# Patient Record
Sex: MALE | Race: WHITE | Employment: FULL TIME | URBAN - METROPOLITAN AREA
[De-identification: names, ages, dates, MRNs, and addresses within clinical notes are randomized per-mention and may not be internally consistent; named-entity substitution may affect disease eponyms.]

---

## 2017-03-23 ENCOUNTER — HOSPITAL ENCOUNTER (EMERGENCY)
Age: 37
Discharge: HOME | End: 2017-03-23
Attending: EMERGENCY MEDICINE
Payer: SELF-PAY

## 2017-03-23 ENCOUNTER — APPOINTMENT (OUTPATIENT)
Dept: GENERAL RADIOLOGY | Age: 37
End: 2017-03-23
Attending: EMERGENCY MEDICINE
Payer: SELF-PAY

## 2017-03-23 VITALS
RESPIRATION RATE: 13 BRPM | DIASTOLIC BLOOD PRESSURE: 67 MMHG | SYSTOLIC BLOOD PRESSURE: 105 MMHG | WEIGHT: 180 LBS | OXYGEN SATURATION: 99 % | TEMPERATURE: 98.2 F | HEIGHT: 77 IN | HEART RATE: 74 BPM | BODY MASS INDEX: 21.25 KG/M2

## 2017-03-23 DIAGNOSIS — F17.210 CIGARETTE SMOKER: ICD-10-CM

## 2017-03-23 DIAGNOSIS — R06.02 SOB (SHORTNESS OF BREATH): Primary | ICD-10-CM

## 2017-03-23 LAB
ANION GAP BLD CALC-SCNC: 8 MMOL/L (ref 5–15)
BASOPHILS # BLD AUTO: 0 K/UL (ref 0–0.1)
BASOPHILS # BLD: 0 % (ref 0–1)
BUN SERPL-MCNC: 15 MG/DL (ref 6–20)
BUN/CREAT SERPL: 13 (ref 12–20)
CALCIUM SERPL-MCNC: 8.9 MG/DL (ref 8.5–10.1)
CHLORIDE SERPL-SCNC: 103 MMOL/L (ref 97–108)
CO2 SERPL-SCNC: 30 MMOL/L (ref 21–32)
CREAT SERPL-MCNC: 1.18 MG/DL (ref 0.7–1.3)
DIFFERENTIAL METHOD BLD: ABNORMAL
EOSINOPHIL # BLD: 0.1 K/UL (ref 0–0.4)
EOSINOPHIL NFR BLD: 2 % (ref 0–7)
ERYTHROCYTE [DISTWIDTH] IN BLOOD BY AUTOMATED COUNT: 12.6 % (ref 11.5–14.5)
GLUCOSE SERPL-MCNC: 90 MG/DL (ref 65–100)
HCT VFR BLD AUTO: 47.6 % (ref 36.6–50.3)
HGB BLD-MCNC: 15.8 G/DL (ref 12.1–17)
LYMPHOCYTES # BLD AUTO: 24 % (ref 12–49)
LYMPHOCYTES # BLD: 1.6 K/UL
MCH RBC QN AUTO: 31.3 PG (ref 26–34)
MCHC RBC AUTO-ENTMCNC: 33.2 G/DL (ref 30–36.5)
MCV RBC AUTO: 94.3 FL (ref 80–99)
MONOCYTES # BLD: 1 K/UL (ref 0–1)
MONOCYTES NFR BLD AUTO: 14 % (ref 5–13)
NEUTS SEG # BLD: 4 K/UL (ref 1.8–8)
NEUTS SEG NFR BLD AUTO: 60 % (ref 32–75)
PLATELET # BLD AUTO: 137 K/UL (ref 150–400)
POTASSIUM SERPL-SCNC: 3.9 MMOL/L (ref 3.5–5.1)
RBC # BLD AUTO: 5.05 M/UL (ref 4.1–5.7)
SODIUM SERPL-SCNC: 141 MMOL/L (ref 136–145)
TROPONIN I SERPL-MCNC: <0.04 NG/ML
WBC # BLD AUTO: 6.7 K/UL (ref 4.1–11.1)

## 2017-03-23 PROCEDURE — 74011250637 HC RX REV CODE- 250/637: Performed by: EMERGENCY MEDICINE

## 2017-03-23 PROCEDURE — 99284 EMERGENCY DEPT VISIT MOD MDM: CPT

## 2017-03-23 PROCEDURE — 94640 AIRWAY INHALATION TREATMENT: CPT

## 2017-03-23 PROCEDURE — 36415 COLL VENOUS BLD VENIPUNCTURE: CPT | Performed by: EMERGENCY MEDICINE

## 2017-03-23 PROCEDURE — 85025 COMPLETE CBC W/AUTO DIFF WBC: CPT | Performed by: EMERGENCY MEDICINE

## 2017-03-23 PROCEDURE — 84484 ASSAY OF TROPONIN QUANT: CPT | Performed by: EMERGENCY MEDICINE

## 2017-03-23 PROCEDURE — 71020 XR CHEST PA LAT: CPT

## 2017-03-23 PROCEDURE — 93005 ELECTROCARDIOGRAM TRACING: CPT

## 2017-03-23 PROCEDURE — 80048 BASIC METABOLIC PNL TOTAL CA: CPT | Performed by: EMERGENCY MEDICINE

## 2017-03-23 PROCEDURE — 77030012341 HC CHMB SPCR OPTC MDI VYRM -A

## 2017-03-23 RX ORDER — ALBUTEROL SULFATE 90 UG/1
2 AEROSOL, METERED RESPIRATORY (INHALATION)
Status: DISCONTINUED | OUTPATIENT
Start: 2017-03-23 | End: 2017-03-23 | Stop reason: HOSPADM

## 2017-03-23 RX ADMIN — ALBUTEROL SULFATE 2 PUFF: 90 AEROSOL, METERED RESPIRATORY (INHALATION) at 17:01

## 2017-03-23 NOTE — DISCHARGE INSTRUCTIONS
Stopping Smoking: Care Instructions  Your Care Instructions  Cigarette smokers crave the nicotine in cigarettes. Giving it up is much harder than simply changing a habit. Your body has to stop craving the nicotine. It is hard to quit, but you can do it. There are many tools that people use to quit smoking. You may find that combining tools works best for you. There are several steps to quitting. First you get ready to quit. Then you get support to help you. After that, you learn new skills and behaviors to become a nonsmoker. For many people, a necessary step is getting and using medicine. Your doctor will help you set up the plan that best meets your needs. You may want to attend a smoking cessation program to help you quit smoking. When you choose a program, look for one that has proven success. Ask your doctor for ideas. You will greatly increase your chances of success if you take medicine as well as get counseling or join a cessation program.  Some of the changes you feel when you first quit tobacco are uncomfortable. Your body will miss the nicotine at first, and you may feel short-tempered and grumpy. You may have trouble sleeping or concentrating. Medicine can help you deal with these symptoms. You may struggle with changing your smoking habits and rituals. The last step is the tricky one: Be prepared for the smoking urge to continue for a time. This is a lot to deal with, but keep at it. You will feel better. Follow-up care is a key part of your treatment and safety. Be sure to make and go to all appointments, and call your doctor if you are having problems. Its also a good idea to know your test results and keep a list of the medicines you take. How can you care for yourself at home? · Ask your family, friends, and coworkers for support. You have a better chance of quitting if you have help and support.   · Join a support group, such as Nicotine Anonymous, for people who are trying to quit smoking. · Consider signing up for a smoking cessation program, such as the American [employer] Association's Freedom from Smoking program.  · Set a quit date. Pick your date carefully so that it is not right in the middle of a big deadline or stressful time. Once you quit, do not even take a puff. Get rid of all ashtrays and lighters after your last cigarette. Clean your house and your clothes so that they do not smell of smoke. · Learn how to be a nonsmoker. Think about ways you can avoid those things that make you reach for a cigarette. ¨ Avoid situations that put you at greatest risk for smoking. For some people, it is hard to have a drink with friends without smoking. For others, they might skip a coffee break with coworkers who smoke. ¨ Change your daily routine. Take a different route to work or eat a meal in a different place. · Cut down on stress. Calm yourself or release tension by doing an activity you enjoy, such as reading a book, taking a hot bath, or gardening. · Talk to your doctor or pharmacist about nicotine replacement therapy, which replaces the nicotine in your body. You still get nicotine but you do not use tobacco. Nicotine replacement products help you slowly reduce the amount of nicotine you need. These products come in several forms, many of them available over-the-counter:  ¨ Nicotine patches  ¨ Nicotine gum and lozenges  ¨ Nicotine inhaler  · Ask your doctor about bupropion (Wellbutrin) or varenicline (Chantix), which are prescription medicines. They do not contain nicotine. They help you by reducing withdrawal symptoms, such as stress and anxiety. · Some people find hypnosis, acupuncture, and massage helpful for ending the smoking habit. · Eat a healthy diet and get regular exercise. Having healthy habits will help your body move past its craving for nicotine. · Be prepared to keep trying. Most people are not successful the first few times they try to quit.  Do not get mad at yourself if you smoke again. Make a list of things you learned and think about when you want to try again, such as next week, next month, or next year. Where can you learn more? Go to http://jonathan-porsha.info/. Enter R969 in the search box to learn more about \"Stopping Smoking: Care Instructions. \"  Current as of: May 26, 2016  Content Version: 11.1  © 9957-9851 [email or web address]. Care instructions adapted under license by [employer] (which disclaims liability or warranty for this information). If you have questions about a medical condition or this instruction, always ask your healthcare professional. [address] any warranty or liability for your use of this information. Shortness of Breath: Care Instructions  Your Care Instructions  Shortness of breath has many causes. Sometimes conditions such as anxiety can lead to shortness of breath. Some people get mild shortness of breath when they exercise. Trouble breathing also can be a symptom of a serious problem, such as asthma, lung disease, emphysema, heart problems, and pneumonia. If your shortness of breath continues, you may need tests and treatment. Watch for any changes in your breathing and other symptoms. Follow-up care is a key part of your treatment and safety. Be sure to make and go to all appointments, and call your doctor if you are having problems. Its also a good idea to know your test results and keep a list of the medicines you take. How can you care for yourself at home? · Do not smoke or allow others to smoke around you. If you need help quitting, talk to your doctor about stop-smoking programs and medicines. These can increase your chances of quitting for good. · Get plenty of rest and sleep. · Take your medicines exactly as prescribed. Call your doctor if you think you are having a problem with your medicine. · Find healthy ways to deal with stress. ¨ Exercise daily.   ¨ Get plenty of sleep. ¨ Eat regularly and well. When should you call for help? Call 911 anytime you think you may need emergency care. For example, call if:  · You have severe shortness of breath. · You have symptoms of a heart attack. These may include:  ¨ Chest pain or pressure, or a strange feeling in the chest.  ¨ Sweating. ¨ Shortness of breath. ¨ Nausea or vomiting. ¨ Pain, pressure, or a strange feeling in the back, neck, jaw, or upper belly or in one or both shoulders or arms. ¨ Lightheadedness or sudden weakness. ¨ A fast or irregular heartbeat. After you call 911, the  may tell you to chew 1 adult-strength or 2 to 4 low-dose aspirin. Wait for an ambulance. Do not try to drive yourself. Call your doctor now or seek immediate medical care if:  · Your shortness of breath gets worse or you start to wheeze. Wheezing is a high-pitched sound when you breathe. · You wake up at night out of breath or have to prop your head up on several pillows to breathe. · You are short of breath after only light activity or while at rest.  Watch closely for changes in your health, and be sure to contact your doctor if:  · You do not get better over the next 1 to 2 days. Where can you learn more? Go to http://jonathan-porsha.info/. Enter S780 in the search box to learn more about \"Shortness of Breath: Care Instructions. \"  Current as of: May 23, 2016  Content Version: 11.1  © 8926-2565 [email or web address]. Care instructions adapted under license by [employer] (which disclaims liability or warranty for this information). If you have questions about a medical condition or this instruction, always ask your healthcare professional. [address] any warranty or liability for your use of this information. We hope that we have addressed all of your medical concerns.  The examination and treatment you received in the Emergency Department were for an emergent problem and were not intended as complete care. It is important that you follow up with your healthcare provider(s) for ongoing care. If your symptoms worsen or do not improve as expected, and you are unable to reach your usual health care provider(s), you should return to the Emergency Department. Today's healthcare is undergoing tremendous change, and patient satisfaction surveys are one of the many tools to assess the quality of medical care. You may receive a survey from the [employer] regarding your experience in the Emergency Department. I hope that your experience has been completely positive, particularly the medical care that I provided. As such, please participate in the survey; anything less than excellent does not meet my expectations or intentions. [facility] and [address] [place] [address] participate in nationally recognized quality of care measures. If your blood pressure is greater than 120/80, as reported below, we urge that you seek medical care to address the potential of high blood pressure, commonly known as hypertension. Hypertension can be hereditary or can be caused by certain medical conditions, pain, stress, or \"white coat syndrome. \"       Please make an appointment with your health care provider(s) for follow up of your Emergency Department visit. VITALS:   Patient Vitals for the past 8 hrs:   Temp Pulse Resp BP SpO2   03/23/17 1612 98.2 °F (36.8 °C) 71 12 123/75 97 %          Thank you for allowing us to provide you with medical care today. We realize that you have many choices for your emergency care needs. Please choose us in the future for any continued health care needs.       Aneesh Wells MD    [address] [facility].   Office: [phone]            Recent Results (from the past 24 hour(s))   EKG, 12 LEAD, INITIAL    Collection Time: 03/23/17  4:08 PM   Result Value Ref Range Ventricular Rate 76 BPM    Atrial Rate 76 BPM    P-R Interval 158 ms    QRS Duration 98 ms    Q-T Interval 378 ms    QTC Calculation (Bezet) 425 ms    Calculated P Axis 73 degrees    Calculated R Axis 92 degrees    Calculated T Axis 60 degrees    Diagnosis       Normal sinus rhythm with sinus arrhythmia  Rightward axis  Incomplete right bundle branch block  Nonspecific ST abnormality  Abnormal ECG  When compared with ECG of 03-JUL-2015 19:15,  Vent. rate has increased BY  30 BPM  QT has lengthened     CBC WITH AUTOMATED DIFF    Collection Time: 03/23/17  4:18 PM   Result Value Ref Range    WBC 6.7 4.1 - 11.1 K/uL    RBC 5.05 4. 10 - 5.70 M/uL    HGB 15.8 12.1 - 17.0 g/dL    HCT 47.6 36.6 - 50.3 %    MCV 94.3 80.0 - 99.0 FL    MCH 31.3 26.0 - 34.0 PG    MCHC 33.2 30.0 - 36.5 g/dL    RDW 12.6 11.5 - 14.5 %    PLATELET 650 (L) 072 - 400 K/uL    NEUTROPHILS 60 32 - 75 %    LYMPHOCYTES 24 12 - 49 %    MONOCYTES 14 (H) 5 - 13 %    EOSINOPHILS 2 0 - 7 %    BASOPHILS 0 0 - 1 %    ABS. NEUTROPHILS 4.0 1.8 - 8.0 K/UL    ABS. LYMPHOCYTES 1.6 K/UL    ABS. MONOCYTES 1.0 0.0 - 1.0 K/UL    ABS. EOSINOPHILS 0.1 0.0 - 0.4 K/UL    ABS. BASOPHILS 0.0 0.0 - 0.1 K/UL    DF AUTOMATED     METABOLIC PANEL, BASIC    Collection Time: 03/23/17  4:18 PM   Result Value Ref Range    Sodium 141 136 - 145 mmol/L    Potassium 3.9 3.5 - 5.1 mmol/L    Chloride 103 97 - 108 mmol/L    CO2 30 21 - 32 mmol/L    Anion gap 8 5 - 15 mmol/L    Glucose 90 65 - 100 mg/dL    BUN 15 6 - 20 MG/DL    Creatinine 1.18 0.70 - 1.30 MG/DL    BUN/Creatinine ratio 13 12 - 20      GFR est AA >60 >60 ml/min/1.73m2    GFR est non-AA >60 >60 ml/min/1.73m2    Calcium 8.9 8.5 - 10.1 MG/DL   TROPONIN I    Collection Time: 03/23/17  4:18 PM   Result Value Ref Range    Troponin-I, Qt. <0.04 <0.08 ng/mL       Xr Chest Pa Lat    Result Date: 3/23/2017  Exam:  2 view chest Indication: Shortness of breath and chest pain started last night.  COMPARISON: 7/3/2015 PA and lateral views demonstrate normal heart size. The patient is on a cardiac monitor. The lungs are well aerated and clear. No adenopathy or pleural effusions. The visualized osseous structures are unremarkable. IMPRESSION: 1.  No acute process

## 2017-03-23 NOTE — ED TRIAGE NOTES
Pt brought to treatment area with c/o \"shortness of breath and chest pain that started last night while I was lying in bed. \"  Pt denies nausea and diaphoresis when chest pain started. Pt states \"the pain gets better when I hold my breath and also when I burp. \"  Pt denies recent illness, fevers, cough.

## 2017-03-23 NOTE — ED NOTES
The patient was discharged home by Dr. Ha Bill and Shy Chaves RN in stable condition. The patient is alert and oriented, is in no respiratory distress. The patient's diagnosis, condition and treatment were explained to patient or parent/guardian. The patient/responsible party expressed understanding. 1 prescriptions given to pt. No work/school note given to pt. A discharge plan has been developed. A  was not involved in the process. Aftercare instructions were given to the patient.

## 2017-03-23 NOTE — ED PROVIDER NOTES
HPI Comments: 45yo M with no sig pmh who smokes cigs and THC who p/w cp and sob. Pt states symptoms started last night around 10:00 PM.  He was lying in bed when felt chest tightness. Symptoms improved when arms raised over head. Symptoms not related to exertion. No cough, fever, vomiting, diarrhea. No log swelling. No wheezing. Pt states he had similar symptoms in 2009 and had unremarkable evaluation. Pt admit to smoking cigs and THC. Pt has not taken any meds today for these symptoms. Patient is a 40 y.o. male presenting with shortness of breath and epigastric pain. The history is provided by the patient and a friend. Shortness of Breath   Pertinent negatives include no fever, no headaches, no cough, no chest pain, no abdominal pain and no rash. Epigastric Pain    Pertinent negatives include no fever, no dysuria, no headaches and no chest pain. No past medical history on file. No past surgical history on file. No family history on file. Social History     Social History    Marital status:      Spouse name: N/A    Number of children: N/A    Years of education: N/A     Occupational History    Not on file. Social History Main Topics    Smoking status: Current Every Day Smoker     Packs/day: 1.00    Smokeless tobacco: Not on file    Alcohol use No    Drug use: Yes     Special: Marijuana    Sexual activity: Not on file     Other Topics Concern    Not on file     Social History Narrative         ALLERGIES: Poison ivy extract    Review of Systems   Constitutional: Negative for diaphoresis and fever. HENT: Negative for facial swelling. Eyes: Negative for visual disturbance. Respiratory: Positive for chest tightness and shortness of breath. Negative for cough. Cardiovascular: Negative for chest pain. Gastrointestinal: Negative for abdominal pain. Genitourinary: Negative for dysuria. Musculoskeletal: Negative for joint swelling.    Skin: Negative for rash.   Neurological: Negative for headaches. Hematological: Negative for adenopathy. Psychiatric/Behavioral: Negative for suicidal ideas. There were no vitals filed for this visit. Physical Exam   Constitutional: He is oriented to person, place, and time. He appears well-developed and well-nourished. No distress. HENT:   Head: Normocephalic and atraumatic. Mouth/Throat: Oropharynx is clear and moist.   Eyes: Pupils are equal, round, and reactive to light. Neck: Normal range of motion. Neck supple. Cardiovascular: Normal rate, regular rhythm, normal heart sounds and intact distal pulses. Pulmonary/Chest: Effort normal and breath sounds normal. No respiratory distress. Abdominal: Soft. Bowel sounds are normal. He exhibits no distension. There is no tenderness. Musculoskeletal: Normal range of motion. He exhibits no edema. Neurological: He is alert and oriented to person, place, and time. Skin: Skin is warm and dry. Nursing note and vitals reviewed. MDM  Number of Diagnoses or Management Options  Diagnosis management comments:         ED Course       Procedures    A:   40 y.o. Male with chest pain. VS are normal with unremarkable PE. Pt very low risk for PE or ACS. Possible etiologies include musculoskeletal pain, pericarditis, myocarditis, tachyarrhythmia, ptx, reactive airway, costochondritis. P:  ECG  CXR  Labs  reasses    Chest X-ray, 2 view:  Chest xray, PA and Lat, shows no signs of acute disease. This CXR was interpreted by Wilian Magdaleno MD, ED Provider. Lab Results Significant For:  CBC:  unremarkable  CMP:  unremarkable  Trop:  unremarkable      ED EKG interpretation:  Rhythm: normal sinus rhythm. Rate (approx.): 76. Axis: normal.  ST segment:  No concerning ST elevations or depressions. This EKG was interpreted by Wilian Magdaleno MD,ED Provider. 5:35 PM  Patient resting comfortably with no complaints at this time. VS remain stable.   Repeat physical exam is unremarkable. Pt ambulatory without difficulty and tolerating po's well.

## 2017-03-23 NOTE — ED NOTES
Pt educated on use and care of albuterol inhaler with spacer. Pt able to return demonstrate use and verbalizes good understanding.

## 2017-03-24 LAB
ATRIAL RATE: 76 BPM
CALCULATED P AXIS, ECG09: 73 DEGREES
CALCULATED R AXIS, ECG10: 92 DEGREES
CALCULATED T AXIS, ECG11: 60 DEGREES
DIAGNOSIS, 93000: NORMAL
P-R INTERVAL, ECG05: 158 MS
Q-T INTERVAL, ECG07: 378 MS
QRS DURATION, ECG06: 98 MS
QTC CALCULATION (BEZET), ECG08: 425 MS
VENTRICULAR RATE, ECG03: 76 BPM

## 2023-02-04 ENCOUNTER — APPOINTMENT (OUTPATIENT)
Dept: GENERAL RADIOLOGY | Age: 43
End: 2023-02-04
Attending: STUDENT IN AN ORGANIZED HEALTH CARE EDUCATION/TRAINING PROGRAM

## 2023-02-04 ENCOUNTER — HOSPITAL ENCOUNTER (EMERGENCY)
Age: 43
Discharge: HOME | End: 2023-02-04
Attending: STUDENT IN AN ORGANIZED HEALTH CARE EDUCATION/TRAINING PROGRAM

## 2023-02-04 VITALS
WEIGHT: 180 LBS | DIASTOLIC BLOOD PRESSURE: 94 MMHG | BODY MASS INDEX: 21.25 KG/M2 | TEMPERATURE: 98 F | RESPIRATION RATE: 11 BRPM | SYSTOLIC BLOOD PRESSURE: 116 MMHG | OXYGEN SATURATION: 97 % | HEART RATE: 88 BPM | HEIGHT: 77 IN

## 2023-02-04 DIAGNOSIS — I48.91 ATRIAL FIBRILLATION WITH RAPID VENTRICULAR RESPONSE (HCC): Primary | ICD-10-CM

## 2023-02-04 LAB
ALBUMIN SERPL-MCNC: 4.7 G/DL (ref 3.5–5)
ALBUMIN/GLOB SERPL: 1.4 (ref 1.1–2.2)
ALP SERPL-CCNC: 67 U/L (ref 45–117)
ALT SERPL-CCNC: 17 U/L (ref 12–78)
ANION GAP SERPL CALC-SCNC: 2 MMOL/L (ref 5–15)
AST SERPL-CCNC: 11 U/L (ref 15–37)
BASOPHILS # BLD: 0.1 K/UL (ref 0–0.1)
BASOPHILS NFR BLD: 1 % (ref 0–1)
BILIRUB SERPL-MCNC: 0.4 MG/DL (ref 0.2–1)
BUN SERPL-MCNC: 20 MG/DL (ref 6–20)
BUN/CREAT SERPL: 18 (ref 12–20)
CALCIUM SERPL-MCNC: 9.2 MG/DL (ref 8.5–10.1)
CHLORIDE SERPL-SCNC: 107 MMOL/L (ref 97–108)
CO2 SERPL-SCNC: 29 MMOL/L (ref 21–32)
COMMENT, HOLDF: NORMAL
CREAT SERPL-MCNC: 1.14 MG/DL (ref 0.7–1.3)
DIFFERENTIAL METHOD BLD: ABNORMAL
EOSINOPHIL # BLD: 0.3 K/UL (ref 0–0.4)
EOSINOPHIL NFR BLD: 3 % (ref 0–7)
ERYTHROCYTE [DISTWIDTH] IN BLOOD BY AUTOMATED COUNT: 12.8 % (ref 11.5–14.5)
GLOBULIN SER CALC-MCNC: 3.4 G/DL (ref 2–4)
GLUCOSE SERPL-MCNC: 95 MG/DL (ref 65–100)
HCT VFR BLD AUTO: 51 % (ref 36.6–50.3)
HGB BLD-MCNC: 16.6 G/DL (ref 12.1–17)
IMM GRANULOCYTES # BLD AUTO: 0 K/UL (ref 0–0.04)
IMM GRANULOCYTES NFR BLD AUTO: 0 % (ref 0–0.5)
LYMPHOCYTES # BLD: 2.7 K/UL (ref 0.8–3.5)
LYMPHOCYTES NFR BLD: 28 % (ref 12–49)
MAGNESIUM SERPL-MCNC: 2.1 MG/DL (ref 1.6–2.4)
MCH RBC QN AUTO: 31.7 PG (ref 26–34)
MCHC RBC AUTO-ENTMCNC: 32.5 G/DL (ref 30–36.5)
MCV RBC AUTO: 97.5 FL (ref 80–99)
MONOCYTES # BLD: 0.7 K/UL (ref 0–1)
MONOCYTES NFR BLD: 8 % (ref 5–13)
NEUTS SEG # BLD: 5.7 K/UL (ref 1.8–8)
NEUTS SEG NFR BLD: 60 % (ref 32–75)
NRBC # BLD: 0 K/UL (ref 0–0.01)
NRBC BLD-RTO: 0 PER 100 WBC
PLATELET # BLD AUTO: 221 K/UL (ref 150–400)
PMV BLD AUTO: 11.9 FL (ref 8.9–12.9)
POTASSIUM SERPL-SCNC: 3.9 MMOL/L (ref 3.5–5.1)
PROT SERPL-MCNC: 8.1 G/DL (ref 6.4–8.2)
RBC # BLD AUTO: 5.23 M/UL (ref 4.1–5.7)
SAMPLES BEING HELD,HOLD: NORMAL
SODIUM SERPL-SCNC: 138 MMOL/L (ref 136–145)
TROPONIN I SERPL HS-MCNC: <4 NG/L (ref 0–76)
WBC # BLD AUTO: 9.5 K/UL (ref 4.1–11.1)

## 2023-02-04 PROCEDURE — 99285 EMERGENCY DEPT VISIT HI MDM: CPT

## 2023-02-04 PROCEDURE — 74011000250 HC RX REV CODE- 250: Performed by: STUDENT IN AN ORGANIZED HEALTH CARE EDUCATION/TRAINING PROGRAM

## 2023-02-04 PROCEDURE — 71046 X-RAY EXAM CHEST 2 VIEWS: CPT

## 2023-02-04 PROCEDURE — 96374 THER/PROPH/DIAG INJ IV PUSH: CPT

## 2023-02-04 PROCEDURE — 74011250637 HC RX REV CODE- 250/637: Performed by: STUDENT IN AN ORGANIZED HEALTH CARE EDUCATION/TRAINING PROGRAM

## 2023-02-04 PROCEDURE — 83735 ASSAY OF MAGNESIUM: CPT

## 2023-02-04 PROCEDURE — 93005 ELECTROCARDIOGRAM TRACING: CPT

## 2023-02-04 PROCEDURE — 36415 COLL VENOUS BLD VENIPUNCTURE: CPT

## 2023-02-04 PROCEDURE — 85025 COMPLETE CBC W/AUTO DIFF WBC: CPT

## 2023-02-04 PROCEDURE — 80053 COMPREHEN METABOLIC PANEL: CPT

## 2023-02-04 PROCEDURE — 84484 ASSAY OF TROPONIN QUANT: CPT

## 2023-02-04 RX ORDER — METOPROLOL TARTRATE 5 MG/5ML
5 INJECTION INTRAVENOUS ONCE
Status: COMPLETED | OUTPATIENT
Start: 2023-02-04 | End: 2023-02-04

## 2023-02-04 RX ORDER — METOPROLOL TARTRATE 25 MG/1
25 TABLET, FILM COATED ORAL
Status: COMPLETED | OUTPATIENT
Start: 2023-02-04 | End: 2023-02-04

## 2023-02-04 RX ORDER — METOPROLOL TARTRATE 25 MG/1
25 TABLET, FILM COATED ORAL 2 TIMES DAILY
Qty: 60 TABLET | Refills: 0 | Status: SHIPPED | OUTPATIENT
Start: 2023-02-04 | End: 2023-03-06

## 2023-02-04 RX ADMIN — METOPROLOL TARTRATE 25 MG: 25 TABLET, FILM COATED ORAL at 20:39

## 2023-02-04 RX ADMIN — METOPROLOL TARTRATE 5 MG: 5 INJECTION, SOLUTION INTRAVENOUS at 20:17

## 2023-02-05 LAB
CALCULATED R AXIS, ECG10: 100 DEGREES
CALCULATED T AXIS, ECG11: 54 DEGREES
DIAGNOSIS, 93000: NORMAL
Q-T INTERVAL, ECG07: 298 MS
QRS DURATION, ECG06: 106 MS
QTC CALCULATION (BEZET), ECG08: 399 MS
VENTRICULAR RATE, ECG03: 108 BPM

## 2023-02-05 NOTE — ED TRIAGE NOTES
Patient c/o chest pain and SOB x2 weeks. States he feels like he has a cold in his chest. Denies any known covid exposure.

## 2023-02-05 NOTE — ED PROVIDER NOTES
56-year-old male with no significant past medical history presents to the ED with chief complaint of shortness of breath for the past 1 to 2 weeks. Dyspnea is waxing and waning, worse on exertion, reports associated right neck tightness. He says he \"feels off\". Denies any fevers, chills, cough, chest pain, abdominal pain, urinary symptoms, bowel symptoms. No history of A. fib, not on any medications. Does not have a regular PCP or see  Frequently. The history is provided by the patient. Shortness of Breath  Pertinent negatives include no chest pain. History reviewed. No pertinent past medical history. Past Surgical History:   Procedure Laterality Date    HX APPENDECTOMY           History reviewed. No pertinent family history. Social History     Socioeconomic History    Marital status:      Spouse name: Not on file    Number of children: Not on file    Years of education: Not on file    Highest education level: Not on file   Occupational History    Not on file   Tobacco Use    Smoking status: Every Day     Packs/day: 1.00     Types: Cigarettes    Smokeless tobacco: Not on file   Substance and Sexual Activity    Alcohol use: No    Drug use: Yes     Types: Marijuana    Sexual activity: Not on file   Other Topics Concern    Not on file   Social History Narrative    Not on file     Social Determinants of Health     Financial Resource Strain: Not on file   Food Insecurity: Not on file   Transportation Needs: Not on file   Physical Activity: Not on file   Stress: Not on file   Social Connections: Not on file   Intimate Partner Violence: Not on file   Housing Stability: Not on file         ALLERGIES: Poison ivy extract    Review of Systems   Respiratory:  Positive for shortness of breath. Cardiovascular:  Negative for chest pain.      Vitals:    02/04/23 1955 02/04/23 2010 02/04/23 2017   BP: (!) 144/85  (!) 124/102   Pulse: 91  (!) 117   Resp: 18     Temp: 97.8 °F (36.6 °C) 98 °F (36.7 °C) SpO2: 100%     Weight: 81.6 kg (180 lb)     Height: 6' 5\" (1.956 m)              Physical Exam  Constitutional:       General: He is not in acute distress. Appearance: He is well-developed. HENT:      Head: Normocephalic and atraumatic. Eyes:      Conjunctiva/sclera: Conjunctivae normal.      Pupils: Pupils are equal, round, and reactive to light. Neck:      Trachea: No tracheal deviation. Cardiovascular:      Rate and Rhythm: Tachycardia present. Rhythm irregular. Heart sounds: No murmur heard. No friction rub. No gallop. Pulmonary:      Effort: No respiratory distress. Breath sounds: Normal breath sounds. Abdominal:      General: Bowel sounds are normal. There is no distension. Palpations: Abdomen is soft. Tenderness: There is no abdominal tenderness. Musculoskeletal:         General: No deformity. Cervical back: Neck supple. Skin:     General: Skin is warm and dry. Neurological:      Mental Status: He is alert and oriented to person, place, and time. Medical Decision Making  52-year-old male presenting to the ED with shortness of breath. Patient in A. fib with RVR with a rate in the 110s to 130s with no prior history. [id number] is 0, will defer anticoagulation. Given suspected duration well over 72 hours will not cardiovert. Will attempt rate control with metoprolol and discharged with oral Toprol with close cardiology follow-up. We will send basic labs, troponin, chest x-ray. Differential includes A. fib with RVR, electrolyte abnormality, less likely ACS. Amount and/or Complexity of Data Reviewed  Labs: ordered. Details: no acute findings  Radiology: ordered and independent interpretation performed. Details: no PNA on chest XR  ECG/medicine tests: ordered and independent interpretation performed. Decision-making details documented in ED Course. Risk  Prescription drug management.       ED Course as of 02/04/23 2019   Sat Feb 04, 2023 2003 ED EKG interpretation:8:03 PM  Rhythm: atrial fib;  Rate (approx.): 105; Axis: right axis deviation; QRS interval: prolonged; ST/T wave: normal; Other findings: abnormal ekg. [JW]      ED Course User Index  [JW] Catrachito Mancera MD       Procedures    DISCHARGE NOTE:  The patient has been re-evaluated and feeling much better and are stable for discharge. All available radiology and laboratory results have been reviewed with patient and/or available family. Patient and/or family verbally conveyed their understanding and agreement of the patient's signs, symptoms, diagnosis, treatment and prognosis and additionally agree to follow-up as recommended in the discharge instructions or to return to the Emergency Department should their condition change or worsen prior to their follow-up appointment. All questions have been answered and patient and/or available family express understanding. LABORATORY RESULTS:  Recent Results (from the past 24 hour(s))   SAMPLES BEING HELD    Collection Time: 02/04/23  8:01 PM   Result Value Ref Range    SAMPLES BEING HELD  1 RED 1 BLUE     COMMENT        Add-on orders for these samples will be processed based on acceptable specimen integrity and analyte stability, which may vary by analyte. TROPONIN-HIGH SENSITIVITY    Collection Time: 02/04/23  8:01 PM   Result Value Ref Range    Troponin-High Sensitivity <4 0 - 76 ng/L   CBC WITH AUTOMATED DIFF    Collection Time: 02/04/23  8:01 PM   Result Value Ref Range    WBC 9.5 4.1 - 11.1 K/uL    RBC 5.23 4. 10 - 5.70 M/uL    HGB 16.6 12.1 - 17.0 g/dL    HCT 51.0 (H) 36.6 - 50.3 %    MCV 97.5 80.0 - 99.0 FL    MCH 31.7 26.0 - 34.0 PG    MCHC 32.5 30.0 - 36.5 g/dL    RDW 12.8 11.5 - 14.5 %    PLATELET 167 868 - 423 K/uL    MPV 11.9 8.9 - 12.9 FL    NRBC 0.0 0  WBC    ABSOLUTE NRBC 0.00 0.00 - 0.01 K/uL    NEUTROPHILS 60 32 - 75 %    LYMPHOCYTES 28 12 - 49 %    MONOCYTES 8 5 - 13 %    EOSINOPHILS 3 0 - 7 % BASOPHILS 1 0 - 1 %    IMMATURE GRANULOCYTES 0 0.0 - 0.5 %    ABS. NEUTROPHILS 5.7 1.8 - 8.0 K/UL    ABS. LYMPHOCYTES 2.7 0.8 - 3.5 K/UL    ABS. MONOCYTES 0.7 0.0 - 1.0 K/UL    ABS. EOSINOPHILS 0.3 0.0 - 0.4 K/UL    ABS. BASOPHILS 0.1 0.0 - 0.1 K/UL    ABS. IMM. GRANS. 0.0 0.00 - 0.04 K/UL    DF AUTOMATED     METABOLIC PANEL, COMPREHENSIVE    Collection Time: 02/04/23  8:01 PM   Result Value Ref Range    Sodium 138 136 - 145 mmol/L    Potassium 3.9 3.5 - 5.1 mmol/L    Chloride 107 97 - 108 mmol/L    CO2 29 21 - 32 mmol/L    Anion gap 2 (L) 5 - 15 mmol/L    Glucose 95 65 - 100 mg/dL    BUN 20 6 - 20 MG/DL    Creatinine 1.14 0.70 - 1.30 MG/DL    BUN/Creatinine ratio 18 12 - 20      eGFR >60 >60 ml/min/1.73m2    Calcium 9.2 8.5 - 10.1 MG/DL    Bilirubin, total 0.4 0.2 - 1.0 MG/DL    ALT (SGPT) 17 12 - 78 U/L    AST (SGOT) 11 (L) 15 - 37 U/L    Alk. phosphatase 67 45 - 117 U/L    Protein, total 8.1 6.4 - 8.2 g/dL    Albumin 4.7 3.5 - 5.0 g/dL    Globulin 3.4 2.0 - 4.0 g/dL    A-G Ratio 1.4 1.1 - 2.2     MAGNESIUM    Collection Time: 02/04/23  8:01 PM   Result Value Ref Range    Magnesium 2.1 1.6 - 2.4 mg/dL       IMAGING RESULTS:  XR CHEST PA LAT    Result Date: 2/4/2023  No acute intrathoracic disease. MEDICATIONS GIVEN:  Medications   metoprolol (LOPRESSOR) injection 5 mg (5 mg IntraVENous Given 2/4/23 2017)   metoprolol tartrate (LOPRESSOR) tablet 25 mg (25 mg Oral Given 2/4/23 2039)       IMPRESSION:  1.  Atrial fibrillation with rapid ventricular response (HCC)        PLAN:  Follow-up Information       Follow up With Specialties Details Why Contact Info    [facility]  In 3 days  [address], [address] [facility]    [facility]  In 3 days  [address], [address]  [facility] [address]  [phone]          Discharge Medication List as of 2/4/2023 10:16 PM          Signed By: Ginny Barnes, MD     February 5, 2023

## 2023-02-05 NOTE — ED NOTES
8:02 PM  I have evaluated the patient as the Provider in Triage. I have reviewed His vital signs and the triage nurse assessment. I have talked with the patient and any available family and advised that I am the provider in triage and have ordered the appropriate study to initiate their work up based on the clinical presentation during my assessment. I have advised that the patient will be accommodated in the Main ED as soon as possible. I have also requested to contact the triage nurse or myself immediately if the patient experiences any changes in their condition during this brief waiting period. 81-year-old male with no significant past medical history presents to ED with 2 weeks of chest pain and shortness of breath. Patient reports that for the past couple of weeks he has had feelings of \"trouble breathing\" with associated chest pain. He reports that he initially just felt \"weird\" but in the past week he started having intermittent trouble breathing which has been causing him anxiety. He described this chest pain as a feeling of \"tenseness\" radiating through his arms and up his neck. He also reports \"I feel like I am going to die\". He reports that these symptoms seem worse when he first wakes up. He also adds that yesterday morning around 5am he woke up and had an episode of emesis. Of note EKG in triage showed new onset A. fib. Patient has no known history of cardiac issues. Patient will be slotted for [place] with cardiac monitor.     Kishor Fong, [address]

## 2023-02-06 ENCOUNTER — OFFICE VISIT (OUTPATIENT)
Dept: CARDIOLOGY CLINIC | Age: 43
End: 2023-02-06

## 2023-02-06 VITALS
SYSTOLIC BLOOD PRESSURE: 96 MMHG | BODY MASS INDEX: 21.79 KG/M2 | HEIGHT: 77 IN | RESPIRATION RATE: 18 BRPM | HEART RATE: 85 BPM | DIASTOLIC BLOOD PRESSURE: 62 MMHG | OXYGEN SATURATION: 98 % | WEIGHT: 184.5 LBS

## 2023-02-06 DIAGNOSIS — R07.89 ATYPICAL CHEST PAIN: ICD-10-CM

## 2023-02-06 DIAGNOSIS — F17.200 CURRENT SMOKER: ICD-10-CM

## 2023-02-06 DIAGNOSIS — I48.0 PAF (PAROXYSMAL ATRIAL FIBRILLATION) (HCC): Primary | ICD-10-CM

## 2023-02-06 DIAGNOSIS — I48.91 ATRIAL FIBRILLATION, UNSPECIFIED TYPE (HCC): ICD-10-CM

## 2023-02-06 DIAGNOSIS — R06.02 SOB (SHORTNESS OF BREATH): ICD-10-CM

## 2023-02-06 PROCEDURE — 99204 OFFICE O/P NEW MOD 45 MIN: CPT | Performed by: INTERNAL MEDICINE

## 2023-02-06 PROCEDURE — 93000 ELECTROCARDIOGRAM COMPLETE: CPT | Performed by: INTERNAL MEDICINE

## 2023-02-06 RX ORDER — DEXTROAMPHETAMINE SACCHARATE, AMPHETAMINE ASPARTATE, DEXTROAMPHETAMINE SULFATE AND AMPHETAMINE SULFATE 5; 5; 5; 5 MG/1; MG/1; MG/1; MG/1
20 TABLET ORAL
COMMUNITY

## 2023-02-06 RX ORDER — SILDENAFIL CITRATE 20 MG/1
20 TABLET ORAL 3 TIMES DAILY
COMMUNITY

## 2023-02-06 NOTE — PROGRESS NOTES
[address], [address] [facility], [address] [place] [address]  [phone]    [address] [place] [address] Way      Subjective: Rukhsana Mutton Call is a 44 y.o. male is here for new patient consultation for new onset atrial fibrillation. Current cigarette smoker. On Adderall for ADHD    Seen in ED 2/4/2023    44year-old male with no significant past medical history presents to ED with 2 weeks of chest pain and shortness of breath. Patient reports that for the past couple of weeks he has had feelings of \"trouble breathing\" with associated chest pain. He reports that he initially just felt \"weird\" but in the past week he started having intermittent trouble breathing which has been causing him anxiety. He described this chest pain as a feeling of \"tenseness\" radiating through his arms and up his neck. He also reports \"I feel like I am going to die\". He reports that these symptoms seem worse when he first wakes up. He also adds that yesterday morning around 5am he woke up and had an episode of emesis. Of note EKG in triage showed new onset A. fib. Patient has no known history of cardiac issues. Patient will be slotted for [place] with cardiac monitor    Work up in EF -ve. Trop/cbc/lytes all ok. Today,     Presents in rate controlled atrial fibrillation    the patient denies chest pain/ shortness of breath, orthopnea, PND, LE edema, palpitations, syncope, or presyncope. Patient Active Problem List    Diagnosis Date Noted    Current smoker 02/06/2023      None  History reviewed. No pertinent past medical history. Past Surgical History:   Procedure Laterality Date    HX APPENDECTOMY       Allergies   Allergen Reactions    Poison Ivy Extract Other (comments)     \"Pt denies allergy\"        History reviewed. No pertinent family history.    Social History     Socioeconomic History    Marital status:      Spouse name: Not on file    Number of children: Not on file    Years of education: Not on file Highest education level: Not on file   Occupational History    Not on file   Tobacco Use    Smoking status: Every Day     Packs/day: 1.00     Types: Cigarettes    Smokeless tobacco: Not on file   Substance and Sexual Activity    Alcohol use: No    Drug use: Yes     Types: Marijuana    Sexual activity: Not on file   Other Topics Concern    Not on file   Social History Narrative    Not on file     Social Determinants of Health     Financial Resource Strain: Not on file   Food Insecurity: Not on file   Transportation Needs: Not on file   Physical Activity: Not on file   Stress: Not on file   Social Connections: Not on file   Intimate Partner Violence: Not on file   Housing Stability: Not on file      Current Outpatient Medications   Medication Sig    dextroamphetamine-amphetamine (AdderalL) 20 mg tablet Take 20 mg by mouth.    sildenafiL (REVATIO) 20 mg tablet Take 20 mg by mouth three (3) times daily. ASA/acetaminophen/caffeine/pot (GOODY'S HEADACHE POWDER PO) Take  by mouth. As needed about 5 - 6 a week    metoprolol tartrate (LOPRESSOR) 25 mg tablet Take 1 Tablet by mouth two (2) times a day for 30 days. No current facility-administered medications for this visit. Review of Symptoms:  11 systems reviewed, negative other than as stated in the HPI    Physical ExamPhysical Exam:    Vitals:    02/06/23 1124   BP: 96/62   Pulse: 85   Resp: 18   SpO2: 98%   Weight: 184 lb 8 oz (83.7 kg)   Height: 6' 5\" (1.956 m)     Body mass index is 21.88 kg/m². General PE  Gen:  NAD  Mental Status - Alert. General Appearance - Not in acute distress. HEENT:  PERRL, no carotid bruits or JVD  Chest and Lung Exam   Inspection: Accessory muscles - No use of accessory muscles in breathing. Auscultation:   Breath sounds: - Normal.   Cardiovascular   Inspection: Jugular vein - Bilateral - Inspection Normal.   Palpation/Percussion:   Apical Impulse: - Normal.   Auscultation: Rhythm - irregular.  Heart Sounds - S1 WNL and S2 WNL. No S3 or S4. Murmurs & Other Heart Sounds: Auscultation of the heart reveals - No Murmurs. Peripheral Vascular   Upper Extremity: Inspection - Bilateral - No Cyanotic nailbeds or Digital clubbing. Lower Extremity:   Palpation: Edema - Bilateral - No edema. Abdomen:   Soft, non-tender, bowel sounds are active. Neuro: A&O times 3, CN and motor grossly WNL    Labs:   No results found for: CHOL, CHOLX, CHLST, CHOLV, 655277, HDL, HDLP, LDL, LDLC, DLDLP, Thel[employer] Agent, [facility], [facility]  Lab Results   Component Value Date/Time     06/21/2012 02:45 PM     Lab Results   Component Value Date/Time    Sodium 138 02/04/2023 08:01 PM    Potassium 3.9 02/04/2023 08:01 PM    Chloride 107 02/04/2023 08:01 PM    CO2 29 02/04/2023 08:01 PM    Anion gap 2 (L) 02/04/2023 08:01 PM    Glucose 95 02/04/2023 08:01 PM    BUN 20 02/04/2023 08:01 PM    Creatinine 1.14 02/04/2023 08:01 PM    BUN/Creatinine ratio 18 02/04/2023 08:01 PM    GFR est AA >60 03/23/2017 04:18 PM    GFR est non-AA >60 03/23/2017 04:18 PM    Calcium 9.2 02/04/2023 08:01 PM    Bilirubin, total 0.4 02/04/2023 08:01 PM    Alk. phosphatase 67 02/04/2023 08:01 PM    Protein, total 8.1 02/04/2023 08:01 PM    Albumin 4.7 02/04/2023 08:01 PM    Globulin 3.4 02/04/2023 08:01 PM    A-G Ratio 1.4 02/04/2023 08:01 PM    ALT (SGPT) 17 02/04/2023 08:01 PM       EKG:  Afib       Assessment:          ICD-10-CM ICD-9-CM    1. PAF (paroxysmal atrial fibrillation) (HCC)  I48.0 427.31       2. Atypical chest pain  R07.89 786.59 AMB POC EKG ROUTINE W/ 12 LEADS, INTER & REP      3. SOB (shortness of breath)  R06.02 786.05 AMB POC EKG ROUTINE W/ 12 LEADS, INTER & REP      4. Atrial fibrillation, unspecified type ([facility]  I48.91 427.31 AMB POC EKG ROUTINE W/ 12 LEADS, INTER & REP      5.  Current smoker  F17.200 305.1           Orders Placed This Encounter    AMB POC EKG ROUTINE W/ 12 LEADS, INTER & REP     Order Specific Question:   Reason for Exam:     Answer:   routine dextroamphetamine-amphetamine (AdderalL) 20 mg tablet     Sig: Take 20 mg by mouth.    sildenafiL (REVATIO) 20 mg tablet     Sig: Take 20 mg by mouth three (3) times daily. ASA/acetaminophen/caffeine/pot (GOODY'S HEADACHE POWDER PO)     Sig: Take  by mouth. As needed about 5 - 6 a week        Plan:     AFib, new onset  Cbc/bmp/trop in 2/2023 ok  Patient states he does not drink alcohol  Continue BB  Obtain echo   Start Eliquis 5 mg twice daily because of need for cardioversion  Schedule cardioversion in 5 weeks-if he converts prior to cardioversion spontaneously obviously we will not have to do that  Check TSH with precardioversion labs  Unfortunately, patient does not have insurance, and advised to try to get insurance and discussed options of other medications such as Xarelto or Coumadin, but with monitoring included for Coumadin that can be expensive as well    Atrial Fibrillation CHADSVASC2 Score Stroke Risk:   44 y.o. <65        + 0    male Male     +0   CHF HX: No    + 0   HTN HX: No    + 0   Stroke/TIA/Thromboembolism No    +0   Vascular Disease HX: No    + 0   Diabetes Mellitus No    + 0   CHADSVASC 2 Score 0      Annual Stroke Risk 0.2% per year        Bp controlled    ADHD  Previously on Adderall, but has not been on it in a month    Current cigarette smoker, marijuana use  Cessation discussed with patient    Patient was made aware during visit today that all testing completed would be instantaneously available on their MyChart for review. Discussed that these results will be made available to the provider at the same time. They were advised to wait at least 3 business days to allow for provider's interpretation of results with follow-up before calling our office with concerns about their results.     Continue current care and f/u in 6 weeks, after cardioversion  Please note that the nurse practitioner helped with precharting, but did not see the patient today, and the entirety of the history, physical, and assessment and plan is mine.     Bethel Wells MD

## 2023-02-06 NOTE — PATIENT INSTRUCTIONS
Your physician has ordered an Echocardiogram in the near future and a Cardioversion in 5 weeks.   Start Eliquis 5 mg twice a day-    Please call Central scheduling at [phone] to schedule your Echocardiogram in a hospital.

## 2023-02-08 ENCOUNTER — TELEPHONE (OUTPATIENT)
Dept: CARDIOLOGY CLINIC | Age: 43
End: 2023-02-08

## 2023-02-08 NOTE — TELEPHONE ENCOUNTER
Spoke to patient and scheduled him for an in office echo on 3/14/23 @ 8:00 am, scheduled him for a Cardioversion with Dr. Mihai Calles 3/15/23 @ 12:30 with an arrival time of 10:30. Instructions gone over with patient, advised no med holds and to have labs done by 3/10/23. Will send patient instructions threw my chart once he sets it up. Patient verbalized understanding and had no questions at the time of call. Sent Dr. Mihai Calles a note making sure the echo and the procedure being so close together is ok, awaiting response.     Patient Instructions    JACQUELYN (Transesophageal Echocardiogram)  Cardioversion  Pre-procedure instructions  Night of the procedure nothing to eat or drink after midnight, you may take approved medications the morning of the procedure with a few sips of water  Medication restrictions: No medication holds    Procedure day  Have a  that will bring you and take you home  Have a responsible adult that can stay with you for 24hrs  Bring ID and insurance card  Wear comfortable clothing  Leave valuables at home, bring: dentures, hearing aids, or glasses  Bring overnight bag (just in case of an overnight stay)  Where to report  [facility]: go through main entrance and to the left is outpatient registration      Date of procedure: 3/15/23 w/ Dr. Moody Serve Time: 10:30 am      Post procedure instructions  No driving for 24 hours  No heavy lifting (over 10lbs) or strenuous activity for 48hrs  You may have tenderness/soreness in groin or wrist area, you may take Tylenol for relief    When to call the office  You notice any tingling, numbness, coldness, or loss of feeling, or you have a fever for 2-3 days after the procedure, if there is visible blood at the site, hold pressure for 20 minutes and then call       [address]                                                           [address] [facility], [address] [place] office: [phone]

## 2023-02-13 RX ORDER — SODIUM CHLORIDE 0.9 % (FLUSH) 0.9 %
5-40 SYRINGE (ML) INJECTION EVERY 8 HOURS
OUTPATIENT
Start: 2023-02-13

## 2023-02-13 RX ORDER — SODIUM CHLORIDE 0.9 % (FLUSH) 0.9 %
5-40 SYRINGE (ML) INJECTION AS NEEDED
OUTPATIENT
Start: 2023-02-13

## 2023-03-14 ENCOUNTER — ANCILLARY PROCEDURE (OUTPATIENT)
Dept: CARDIOLOGY CLINIC | Age: 43
End: 2023-03-14

## 2023-03-14 VITALS — BODY MASS INDEX: 21.73 KG/M2 | WEIGHT: 184 LBS | HEIGHT: 77 IN

## 2023-03-14 DIAGNOSIS — R06.02 SOB (SHORTNESS OF BREATH): ICD-10-CM

## 2023-03-14 DIAGNOSIS — R07.89 ATYPICAL CHEST PAIN: ICD-10-CM

## 2023-03-14 DIAGNOSIS — I48.91 ATRIAL FIBRILLATION, UNSPECIFIED TYPE (HCC): ICD-10-CM

## 2023-03-14 LAB
ECHO AO ASC DIAM: 3.4 CM
ECHO AO ASCENDING AORTA INDEX: 1.57 CM/M2
ECHO AO ROOT DIAM: 4.2 CM
ECHO AO ROOT INDEX: 1.94 CM/M2
ECHO AV AREA PEAK VELOCITY: 3.3 CM2
ECHO AV AREA VTI: 3 CM2
ECHO AV AREA/BSA PEAK VELOCITY: 1.5 CM2/M2
ECHO AV AREA/BSA VTI: 1.4 CM2/M2
ECHO AV MEAN GRADIENT: 2 MMHG
ECHO AV MEAN VELOCITY: 0.7 M/S
ECHO AV PEAK GRADIENT: 3 MMHG
ECHO AV PEAK VELOCITY: 0.9 M/S
ECHO AV VELOCITY RATIO: 0.78
ECHO AV VTI: 16.6 CM
ECHO LA DIAMETER INDEX: 1.3 CM/M2
ECHO LA DIAMETER: 2.8 CM
ECHO LA TO AORTIC ROOT RATIO: 0.67
ECHO LA VOL 4C: 20 ML (ref 18–58)
ECHO LA VOLUME INDEX A4C: 9 ML/M2 (ref 16–34)
ECHO LV EDV A2C: 107 ML
ECHO LV EDV A4C: 109 ML
ECHO LV EDV BP: 111 ML (ref 67–155)
ECHO LV EDV INDEX A4C: 50 ML/M2
ECHO LV EDV INDEX BP: 51 ML/M2
ECHO LV EDV NDEX A2C: 50 ML/M2
ECHO LV EJECTION FRACTION A2C: 37 %
ECHO LV EJECTION FRACTION A4C: 38 %
ECHO LV EJECTION FRACTION BIPLANE: 38 % (ref 55–100)
ECHO LV ESV A2C: 68 ML
ECHO LV ESV A4C: 68 ML
ECHO LV ESV BP: 69 ML (ref 22–58)
ECHO LV ESV INDEX A2C: 31 ML/M2
ECHO LV ESV INDEX A4C: 31 ML/M2
ECHO LV ESV INDEX BP: 32 ML/M2
ECHO LV FRACTIONAL SHORTENING: 29 % (ref 28–44)
ECHO LV INTERNAL DIMENSION DIASTOLE INDEX: 2.08 CM/M2
ECHO LV INTERNAL DIMENSION DIASTOLIC: 4.5 CM (ref 4.2–5.9)
ECHO LV INTERNAL DIMENSION SYSTOLIC INDEX: 1.48 CM/M2
ECHO LV INTERNAL DIMENSION SYSTOLIC: 3.2 CM
ECHO LV IVSD: 1.2 CM (ref 0.6–1)
ECHO LV MASS 2D: 198.1 G (ref 88–224)
ECHO LV MASS INDEX 2D: 91.7 G/M2 (ref 49–115)
ECHO LV POSTERIOR WALL DIASTOLIC: 1.2 CM (ref 0.6–1)
ECHO LV RELATIVE WALL THICKNESS RATIO: 0.53
ECHO LVOT AREA: 4.5 CM2
ECHO LVOT AV VTI INDEX: 0.67
ECHO LVOT DIAM: 2.4 CM
ECHO LVOT MEAN GRADIENT: 1 MMHG
ECHO LVOT PEAK GRADIENT: 2 MMHG
ECHO LVOT PEAK VELOCITY: 0.7 M/S
ECHO LVOT STROKE VOLUME INDEX: 23.4 ML/M2
ECHO LVOT SV: 50.6 ML
ECHO LVOT VTI: 11.2 CM
ECHO PULMONARY ARTERY END DIASTOLIC PRESSURE: 7 MMHG
ECHO PV MAX VELOCITY: 0.6 M/S
ECHO PV PEAK GRADIENT: 2 MMHG
ECHO PV REGURGITANT MAX VELOCITY: 1.3 M/S
ECHO RA MINOR AXIS INDEX: 1.71 CM/M2
ECHO RA MINOR AXIS: 3.7 CM
ECHO RV INTERNAL DIMENSION: 4.1 CM
ECHO RV TAPSE: 1.8 CM (ref 1.7–?)
ECHO TV REGURGITANT MAX VELOCITY: 1.95 M/S
ECHO TV REGURGITANT PEAK GRADIENT: 15 MMHG

## 2023-03-14 PROCEDURE — 93306 TTE W/DOPPLER COMPLETE: CPT | Performed by: INTERNAL MEDICINE

## 2023-03-15 ENCOUNTER — HOSPITAL ENCOUNTER (OUTPATIENT)
Dept: NON INVASIVE DIAGNOSTICS | Age: 43
Discharge: HOME | End: 2023-03-15
Attending: INTERNAL MEDICINE

## 2023-03-15 ENCOUNTER — ANESTHESIA (OUTPATIENT)
Dept: NON INVASIVE DIAGNOSTICS | Age: 43
End: 2023-03-15

## 2023-03-15 ENCOUNTER — ANESTHESIA EVENT (OUTPATIENT)
Dept: NON INVASIVE DIAGNOSTICS | Age: 43
End: 2023-03-15

## 2023-03-15 VITALS
HEART RATE: 68 BPM | TEMPERATURE: 98 F | SYSTOLIC BLOOD PRESSURE: 110 MMHG | RESPIRATION RATE: 18 BRPM | DIASTOLIC BLOOD PRESSURE: 86 MMHG | OXYGEN SATURATION: 100 %

## 2023-03-15 DIAGNOSIS — I48.91 ATRIAL FIBRILLATION, UNSPECIFIED TYPE (HCC): ICD-10-CM

## 2023-03-15 LAB
ATRIAL RATE: 88 BPM
CALCULATED P AXIS, ECG09: 72 DEGREES
CALCULATED R AXIS, ECG10: 58 DEGREES
CALCULATED T AXIS, ECG11: 71 DEGREES
DIAGNOSIS, 93000: NORMAL
P-R INTERVAL, ECG05: 178 MS
Q-T INTERVAL, ECG07: 382 MS
QRS DURATION, ECG06: 94 MS
QTC CALCULATION (BEZET), ECG08: 462 MS
VENTRICULAR RATE, ECG03: 88 BPM

## 2023-03-15 PROCEDURE — 74011250636 HC RX REV CODE- 250/636: Performed by: ANESTHESIOLOGY

## 2023-03-15 PROCEDURE — 93005 ELECTROCARDIOGRAM TRACING: CPT

## 2023-03-15 PROCEDURE — 74011250637 HC RX REV CODE- 250/637: Performed by: INTERNAL MEDICINE

## 2023-03-15 PROCEDURE — 92960 CARDIOVERSION ELECTRIC EXT: CPT

## 2023-03-15 PROCEDURE — 99152 MOD SED SAME PHYS/QHP 5/>YRS: CPT | Performed by: INTERNAL MEDICINE

## 2023-03-15 PROCEDURE — 76060000032 HC ANESTHESIA 0.5 TO 1 HR

## 2023-03-15 PROCEDURE — 92960 CARDIOVERSION ELECTRIC EXT: CPT | Performed by: INTERNAL MEDICINE

## 2023-03-15 RX ORDER — PHENYLEPHRINE HCL IN 0.9% NACL 0.4MG/10ML
SYRINGE (ML) INTRAVENOUS AS NEEDED
Status: DISCONTINUED | OUTPATIENT
Start: 2023-03-15 | End: 2023-03-15

## 2023-03-15 RX ORDER — PROPOFOL 10 MG/ML
INJECTION, EMULSION INTRAVENOUS AS NEEDED
Status: DISCONTINUED | OUTPATIENT
Start: 2023-03-15 | End: 2023-03-15 | Stop reason: HOSPADM

## 2023-03-15 RX ORDER — SODIUM CHLORIDE 9 MG/ML
INJECTION, SOLUTION INTRAVENOUS
Status: DISCONTINUED | OUTPATIENT
Start: 2023-03-15 | End: 2023-03-15 | Stop reason: HOSPADM

## 2023-03-15 RX ORDER — METOPROLOL TARTRATE 25 MG/1
TABLET, FILM COATED ORAL 2 TIMES DAILY
COMMUNITY
End: 2023-03-15 | Stop reason: SDUPTHER

## 2023-03-15 RX ORDER — METOPROLOL TARTRATE 25 MG/1
25 TABLET, FILM COATED ORAL
Status: COMPLETED | OUTPATIENT
Start: 2023-03-15 | End: 2023-03-15

## 2023-03-15 RX ORDER — METOPROLOL TARTRATE 25 MG/1
25 TABLET, FILM COATED ORAL 2 TIMES DAILY
Qty: 180 TABLET | Refills: 4 | Status: SHIPPED | OUTPATIENT
Start: 2023-03-15

## 2023-03-15 RX ADMIN — PROPOFOL 100 MG: 10 INJECTION, EMULSION INTRAVENOUS at 13:02

## 2023-03-15 RX ADMIN — SODIUM CHLORIDE: 900 INJECTION, SOLUTION INTRAVENOUS at 12:28

## 2023-03-15 RX ADMIN — METOPROLOL TARTRATE 25 MG: 25 TABLET, FILM COATED ORAL at 13:42

## 2023-03-15 RX ADMIN — PROPOFOL 75 MG: 10 INJECTION, EMULSION INTRAVENOUS at 13:03

## 2023-03-15 NOTE — PROGRESS NOTES
Anesthesia name:  Dr. Estrada Dior in room  Anesthesia is present for case. Refer to anesthesia log for vitals.

## 2023-03-15 NOTE — PROGRESS NOTES
Pt awake, alert, and oriented. VSS. Pt denies chest pain, SOB, or dizziness. I reviewed discharge instructions with pt and with his girlfriend. Both verbalized understanding of instructions. Dr. Seda Florian also spoke with pt and girlfriend. I emphasized to pt the importance of taking eliquis and metoprolol as prescribed until further notice. Pt discharged to home with belongings and instructions via w/c with his girlfriend.

## 2023-03-15 NOTE — DISCHARGE INSTRUCTIONS
Discharge Instructions for Cardioversion    Your healthcare provider performed a procedure called cardioversion. Your healthcare provider used a controlled electric shock or a medicine to briefly stop all electrical activity in your heart. This helped restore your hearts normal rhythm. Here are some instructions to follow while you recover. Home care  Because cardioversion typically requires sedation, you won't be able to drive home. You will need a ride. Wait at least 24 hours before driving a car or operating heavy machinery after receiving sedating medicines. Dont be alarmed if the skin on your chest is irritated or feels like it is sunburned. Your healthcare provider may prescribe a soothing lotion to relieve this discomfort. These minor symptoms will go away in a few days. Ask your healthcare provider about medicines to keep your heart rhythm steady. If you were prescribed medicine, take it as instructed by your healthcare provider. Dont skip doses or take double doses. Cardioversion requires blood thinners for at least 4 weeks to prevent a delayed risk of stroke when treating atrial fibrillation or atrial flutter. Be sure you discuss which medicine you are taking to prevent stroke. Ask when you need to have your medicine levels checked, and whether you may be able to stop taking it in the future or whether it is recommended that you take it for life. Some of these blood-thinning medicines will have the dose adjusted and interact with other medicines or foods. Your healthcare team will give you full instructions on what to watch out for. Report bleeding or symptoms of stroke immediately to your healthcare team and seek emergency medical attention. Learn to take your own pulse. Keep a record of your results. Ask your healthcare provider when you should seek emergency medical attention. He or she will tell you which pulse rate reading is dangerous.      Keep in mind this procedure may need to be repeated if the abnormal heart rhythm returns. After the procedure, your healthcare provider will tell you if the treatment worked or if you will need further treatments or medication. Follow-up Care  Make a follow-up appointment, or as directed. Call 911  Call 911 right away if you have:    Chest pain    Shortness of breath    Loss of vision, speech, or strength or coordination in any body part    When to call your healthcare provider  Call your healthcare provider right away if you:    Feel faint, dizzy, or lightheaded    Have chest pain with increased activity    Have irregular heartbeat or fast pulse    Have bleeding issues from blood-thinning medicines. CONTINUE TAKING YOUR ELIQUIS AND METOPROLOL AS PRESCRIBED; FOLLOWUP WITH DR LOMBARDI 'S OFFICE IN ONE MONTH.

## 2023-03-15 NOTE — ANESTHESIA POSTPROCEDURE EVALUATION
* No procedures listed *. MAC    Anesthesia Post Evaluation        Patient location during evaluation: PACU  Patient participation: complete - patient participated  Level of consciousness: awake and alert  Pain management: adequate  Airway patency: patent  Anesthetic complications: no  Cardiovascular status: acceptable  Respiratory status: acceptable  Hydration status: acceptable  Comments: I have seen and evaluated the patient and is ready for discharge. Idalia Wei MD    Post anesthesia nausea and vomiting:  none      INITIAL Post-op Vital signs:   Vitals Value Taken Time   BP 99/80 03/15/23 1329   Temp     Pulse 76 03/15/23 1330   Resp 15 03/15/23 1330   SpO2 98 % 03/15/23 1330   Vitals shown include unvalidated device data.

## 2023-03-15 NOTE — ANESTHESIA PREPROCEDURE EVALUATION
Relevant Problems   No relevant active problems       Anesthetic History   No history of anesthetic complications            Review of Systems / Medical History  Patient summary reviewed, nursing notes reviewed and pertinent labs reviewed    Pulmonary  Within defined limits                 Neuro/Psych   Within defined limits           Cardiovascular  Within defined limits                Exercise tolerance: >4 METS     GI/Hepatic/Renal  Within defined limits              Endo/Other  Within defined limits           Other Findings              Physical Exam    Airway  Mallampati: II  TM Distance: 4 - 6 cm  Neck ROM: normal range of motion   Mouth opening: Normal     Cardiovascular    Rhythm: regular  Rate: normal         Dental  No notable dental hx       Pulmonary  Breath sounds clear to auscultation               Abdominal  GI exam deferred       Other Findings            Anesthetic Plan    ASA: 3  Anesthesia type: MAC          Induction: Intravenous  Anesthetic plan and risks discussed with: Patient

## 2023-03-16 NOTE — PROGRESS NOTES
Echo showed normal heart pumping strength, mild leakage across mitral valve which  Can be due to his afib. Aorta is sl enlarged. Need good bp control; aim for 130/80 or less.  Will repeat echo in 2-3 yrs

## 2023-03-17 ENCOUNTER — TELEPHONE (OUTPATIENT)
Dept: CARDIOLOGY CLINIC | Age: 43
End: 2023-03-17

## 2023-03-17 NOTE — TELEPHONE ENCOUNTER
----- Message from Seda Weaver NP sent at 3/16/2023  2:38 PM EDT -----  Echo showed normal heart pumping strength, mild leakage across mitral valve which  Can be due to his afib. Aorta is sl enlarged. Need good bp control; aim for 130/80 or less. Will repeat echo in 2-3 yrs    Verified Patient with two identifiers  Spoke with patient regarding results and recommendations. Patient voiced understanding.

## 2023-04-25 ENCOUNTER — OFFICE VISIT (OUTPATIENT)
Dept: CARDIOLOGY CLINIC | Age: 43
End: 2023-04-25

## 2023-04-25 VITALS
HEIGHT: 77 IN | SYSTOLIC BLOOD PRESSURE: 136 MMHG | HEART RATE: 55 BPM | OXYGEN SATURATION: 97 % | BODY MASS INDEX: 22.43 KG/M2 | DIASTOLIC BLOOD PRESSURE: 82 MMHG | RESPIRATION RATE: 18 BRPM | WEIGHT: 190 LBS

## 2023-04-25 DIAGNOSIS — F17.200 CURRENT SMOKER: ICD-10-CM

## 2023-04-25 DIAGNOSIS — R06.02 SOB (SHORTNESS OF BREATH): ICD-10-CM

## 2023-04-25 DIAGNOSIS — I48.0 PAF (PAROXYSMAL ATRIAL FIBRILLATION) (HCC): Primary | ICD-10-CM

## 2023-04-25 DIAGNOSIS — R07.89 ATYPICAL CHEST PAIN: ICD-10-CM

## 2023-04-25 PROCEDURE — 99214 OFFICE O/P EST MOD 30 MIN: CPT | Performed by: INTERNAL MEDICINE

## 2023-04-25 PROCEDURE — 93000 ELECTROCARDIOGRAM COMPLETE: CPT | Performed by: INTERNAL MEDICINE

## 2023-04-25 RX ORDER — METOPROLOL SUCCINATE 25 MG/1
12.5 TABLET, EXTENDED RELEASE ORAL DAILY
Qty: 30 TABLET | Refills: 2 | Status: SHIPPED | OUTPATIENT
Start: 2023-04-25

## 2023-04-25 RX ORDER — METOPROLOL TARTRATE 25 MG/1
12.5 TABLET, FILM COATED ORAL 2 TIMES DAILY
Qty: 30 TABLET | Refills: 2 | Status: SHIPPED | OUTPATIENT
Start: 2023-04-25 | End: 2023-04-25 | Stop reason: CLARIF

## 2023-04-25 NOTE — PROGRESS NOTES
[address], [facility], [address]  [phone]    [address] [place] [address], [facility] [address]      Subjective: Dilcia Judd is a 44 y.o. male is here for routine f/u. The patient was last seen by us 3/15/2023 for cardioversion. He ran of Eliquis for few days then restarted it. Ever since starting Eliquis and metoprolol, headaches which she had daily for years have disappeared. 1 day he had 1 transient episode of fast heart rate, checked his blood pressure and pulse and his pulse was 145. None since. Stopped drinking, drinking excessive caffeine, smoking, and using excessive marijuana. Stopped Adderall. Today, the patient denies chest pain/ shortness of breath, orthopnea, PND, LE edema, palpitations, syncope, or presyncope. Patient Active Problem List    Diagnosis Date Noted    Current smoker 02/06/2023      None  History reviewed. No pertinent past medical history. Past Surgical History:   Procedure Laterality Date    HX APPENDECTOMY       Allergies   Allergen Reactions    Poison Ivy Extract Other (comments)     \"Pt denies allergy\"        History reviewed. No pertinent family history.    Social History     Socioeconomic History    Marital status:      Spouse name: Not on file    Number of children: Not on file    Years of education: Not on file    Highest education level: Not on file   Occupational History    Not on file   Tobacco Use    Smoking status: Former     Packs/day: 1.00     Types: Cigarettes     Passive exposure: Past    Smokeless tobacco: Not on file    Tobacco comments:     Quit 2022   Substance and Sexual Activity    Alcohol use: No    Drug use: Not Currently     Types: Marijuana    Sexual activity: Not on file   Other Topics Concern    Not on file   Social History Narrative    Not on file     Social Determinants of Health     Financial Resource Strain: Not on file   Food Insecurity: Not on file   Transportation Needs: Not on file   Physical Activity: Not on file   Stress: Not on file   Social Connections: Not on file   Intimate Partner Violence: Not on file   Housing Stability: Not on file      Current Outpatient Medications   Medication Sig    metoprolol tartrate (LOPRESSOR) 25 mg tablet Take 1 Tablet by mouth two (2) times a day. To help prevent atrial fibrillation    apixaban (ELIQUIS) 5 mg tablet Take 1 Tablet by mouth two (2) times a day. sildenafiL (REVATIO) 20 mg tablet Take 20 mg by mouth three (3) times daily. (Patient not taking: Reported on 4/25/2023)    ASA/acetaminophen/caffeine/pot (GOODY'S HEADACHE POWDER PO) Take  by mouth. As needed about 5 - 6 a week (Patient not taking: Reported on 4/25/2023)     No current facility-administered medications for this visit. Review of Symptoms:  11 systems reviewed, negative other than as stated in the HPI    Physical ExamPhysical Exam:    Vitals:    04/25/23 1327   BP: 136/82   Pulse: (!) 55   Resp: 18   SpO2: 97%   Weight: 190 lb (86.2 kg)   Height: 6' 5\" (1.956 m)     Body mass index is 22.53 kg/m². General PE  Gen:  NAD  Mental Status - Alert. General Appearance - Not in acute distress. HEENT:  PERRL, no carotid bruits or JVD  Chest and Lung Exam   Inspection: Accessory muscles - No use of accessory muscles in breathing. Auscultation:   Breath sounds: - Normal.   Cardiovascular   Inspection: Jugular vein - Bilateral - Inspection Normal.   Palpation/Percussion:   Apical Impulse: - Normal.   Auscultation: Rhythm - Regular. Heart Sounds - S1 WNL and S2 WNL. No S3 or S4. Murmurs & Other Heart Sounds: Auscultation of the heart reveals - No Murmurs. Peripheral Vascular   Upper Extremity: Inspection - Bilateral - No Cyanotic nailbeds or Digital clubbing. Lower Extremity:   Palpation: Edema - Bilateral - No edema. Abdomen:   Soft, non-tender, bowel sounds are active.   Neuro: A&O times 3, CN and motor grossly WNL    Labs:   No results found for: CHOL, CHOLX, CHLST, CHOLV, 331638, HDL, HDLP, LDL, LDLC, DLDLP, Hensley Jessica, [facility], [facility]  Lab Results   Component Value Date/Time     06/21/2012 02:45 PM     Lab Results   Component Value Date/Time    Sodium 138 02/04/2023 08:01 PM    Potassium 3.9 02/04/2023 08:01 PM    Chloride 107 02/04/2023 08:01 PM    CO2 29 02/04/2023 08:01 PM    Anion gap 2 (L) 02/04/2023 08:01 PM    Glucose 95 02/04/2023 08:01 PM    BUN 20 02/04/2023 08:01 PM    Creatinine 1.14 02/04/2023 08:01 PM    BUN/Creatinine ratio 18 02/04/2023 08:01 PM    GFR est AA >60 03/23/2017 04:18 PM    GFR est non-AA >60 03/23/2017 04:18 PM    Calcium 9.2 02/04/2023 08:01 PM    Bilirubin, total 0.4 02/04/2023 08:01 PM    Alk. phosphatase 67 02/04/2023 08:01 PM    Protein, total 8.1 02/04/2023 08:01 PM    Albumin 4.7 02/04/2023 08:01 PM    Globulin 3.4 02/04/2023 08:01 PM    A-G Ratio 1.4 02/04/2023 08:01 PM    ALT (SGPT) 17 02/04/2023 08:01 PM       EKG:  NSR    03/14/23    ECHO ADULT COMPLETE 03/14/2023 3/14/2023    Interpretation Summary    Left Ventricle: Normal left ventricular systolic function with a visually estimated EF of 55 - 60%. Left ventricle size is normal. Mildly increased wall thickness. Normal wall motion. Mitral Valve: Mild regurgitation. Aorta: Normal sized ascending aorta. Mildly dilated aortic root. Ao Root diameter is 4.2 cm. Signed by: Amisha Lowe MD on 3/14/2023  8:11 PM           Assessment:          ICD-10-CM ICD-9-CM    1. PAF (paroxysmal atrial fibrillation) (HCC)  I48.0 427.31       2. Atypical chest pain  R07.89 786.59 AMB POC EKG ROUTINE W/ 12 LEADS, INTER & REP      3. SOB (shortness of breath)  R06.02 786.05 AMB POC EKG ROUTINE W/ 12 LEADS, INTER & REP      4. Current smoker  F17.200 305.1           Orders Placed This Encounter    AMB POC EKG ROUTINE W/ 12 LEADS, INTER & REP     Order Specific Question:   Reason for Exam:     Answer:   routine        Plan:     Kayode Bambi Call is a 44 y.o. male is here for routine f/u.   The patient was last seen by us 3/15/2023 for cardioversion. He ran of Eliquis for few days then restarted it. Ever since starting Eliquis and metoprolol, headaches which she had daily for years have disappeared. 1 day he had 1 transient episode of fast heart rate, checked his blood pressure and pulse and his pulse was 145. None since. Stopped drinking, drinking excessive caffeine, smoking, and using excessive marijuana. Stopped Adderall. AFib, status post cardioversion 3/15/2023, [id number]:     Atrial Fibrillation CHADSVASC2 Score Stroke Risk:   44 y.o. <65        + 0    male Male     [de-identified]   CHF HX: No    + 0   HTN HX: No    + 0   Stroke/TIA/Thromboembolism No    +0   Vascular Disease HX: No    + 0   Diabetes Mellitus No    + 0   CHADSVASC 2 Score 0      Annual Stroke Risk 0.2% per year   1 episode suspicious for A-fib since cardioversion that was transient-continue Eliquis for 3 months, reassess in 3 months, consider changing to aspirin 81 mg at that time. Discount voucher and samples being provided today, since he has no insurance  Some sexual dysfunction since starting these medications. Change from metoprolol to tartrate 25 twice daily to metoprolol succinate 12.5 nightly. Bp controlled     ADHD  Previously on Adderall, but has stopped without adverse consequence    Current cigarette smoker, marijuana use  Cessation discussed with patient and he has stopped. Follow-up in 3 months, sooner as needed.     Bety Davis MD

## 2023-04-25 NOTE — PATIENT INSTRUCTIONS
Stop taking Metoprolol Tartrate and Start taking Metoprolol succinate 12.5 mg every night before sleep.

## 2023-05-10 RX ORDER — APIXABAN 5 MG/1
TABLET, FILM COATED ORAL
Qty: 60 TABLET | Refills: 1 | Status: SHIPPED | OUTPATIENT
Start: 2023-05-10

## 2023-05-10 NOTE — TELEPHONE ENCOUNTER
Per physician notes on 04-  \"1 episode suspicious for A-fib since cardioversion that was transient-continue Eliquis for 3 months, reassess in 3 months, consider changing to aspirin 81 mg at that time. Discount  voucher and samples being provided today, since he has no insurance\". Refill Request Received for the Following Medication     Requested Prescriptions     Pending Prescriptions Disp Refills    ELIQUIS 5 MG TABS tablet [Pharmacy Med Name: ELIQUIS 5 MG TABLET] 60 tablet 1     Sig: TAKE 1 TABLET BY MOUTH TWO TIMES A DAY.        Last Prescribed:04-    Last Appointment With Me:  4/25/2023     Future Appointments:  Future Appointments   Date Time Provider Jacki Chacko   7/25/2023  2:20 PM MD ADARSH Kunz AMB

## 2023-09-05 NOTE — TELEPHONE ENCOUNTER
Patient in requesting samples of Eliquis. 2 boxes for eliquis 5 mg given   Lote [id number], Exp May 2025.

## 2023-12-01 RX ORDER — APIXABAN 5 MG/1
5 TABLET, FILM COATED ORAL 2 TIMES DAILY
Qty: 60 TABLET | Refills: 0 | Status: SHIPPED | OUTPATIENT
Start: 2023-12-01

## 2024-01-04 RX ORDER — APIXABAN 5 MG/1
5 TABLET, FILM COATED ORAL 2 TIMES DAILY
Qty: 60 TABLET | Refills: 2 | Status: SHIPPED | OUTPATIENT
Start: 2024-01-04

## 2024-01-04 NOTE — TELEPHONE ENCOUNTER
Patient did not show for his appointment on 10-30-23. No future appointments found.  Eliquis refilled for 90 days, needs FU appointment for further refills.

## 2024-01-05 RX ORDER — METOPROLOL SUCCINATE 25 MG/1
12.5 TABLET, EXTENDED RELEASE ORAL DAILY
Qty: 45 TABLET | Refills: 0 | Status: SHIPPED | OUTPATIENT
Start: 2024-01-05

## 2024-01-05 NOTE — TELEPHONE ENCOUNTER
Refill Request Received for the Following Medication     Requested Prescriptions     Pending Prescriptions Disp Refills    metoprolol succinate (TOPROL XL) 25 MG extended release tablet [Pharmacy Med Name: METOPROLOL SUCC ER 25 MG TAB] 30 tablet 2     Sig: TAKE 1/2 TABLET BY MOUTH EVERY DAY       Last Prescribed: 03-    Last Appointment With Me:  4/25/2023     Future Appointments:  No future appointments.

## 2024-04-29 ENCOUNTER — TELEPHONE (OUTPATIENT)
Age: 44
End: 2024-04-29

## 2024-04-29 NOTE — TELEPHONE ENCOUNTER
Okay refill 1 month of Eliquis, follow-up as scheduled for tomorrow, and we can reassess long-term need for Eliquis at that appointment.  Thanks.

## 2024-04-29 NOTE — TELEPHONE ENCOUNTER
Spoke with patient, verified with 2 identifiers. This nurse explained that he needed to FU with physician after 3 months of last appointment on April 2023.  Appointment made for tomorrow 04-30-24 at 8:20am. Patient voiced understanding.  Patient then stated that he haven't taken his Eliquis for the last 3 weeks since he did not have any insurance. Recently signed for insurance stated. Not on Aspirin at this time.     This nurse will ask dr. Herrera if okay to refill Eliquis since in last office visit was stated:  \"1 episode suspicious for A-fib since cardioversion that was transient-continue Eliquis for 3 months, reassess in 3 months, consider changing to aspirin 81 mg at that time\".  Please advise

## 2024-04-30 ENCOUNTER — OFFICE VISIT (OUTPATIENT)
Age: 44
End: 2024-04-30
Payer: COMMERCIAL

## 2024-04-30 ENCOUNTER — TELEPHONE (OUTPATIENT)
Age: 44
End: 2024-04-30

## 2024-04-30 VITALS
OXYGEN SATURATION: 97 % | HEIGHT: 77 IN | WEIGHT: 200 LBS | BODY MASS INDEX: 23.62 KG/M2 | HEART RATE: 80 BPM | RESPIRATION RATE: 18 BRPM | SYSTOLIC BLOOD PRESSURE: 110 MMHG | DIASTOLIC BLOOD PRESSURE: 72 MMHG

## 2024-04-30 DIAGNOSIS — F41.9 ANXIETY: ICD-10-CM

## 2024-04-30 DIAGNOSIS — R00.2 PALPITATIONS: Primary | ICD-10-CM

## 2024-04-30 DIAGNOSIS — I48.0 PAROXYSMAL ATRIAL FIBRILLATION (HCC): ICD-10-CM

## 2024-04-30 DIAGNOSIS — F90.8 ATTENTION DEFICIT HYPERACTIVITY DISORDER (ADHD), OTHER TYPE: ICD-10-CM

## 2024-04-30 DIAGNOSIS — R06.02 SHORTNESS OF BREATH: ICD-10-CM

## 2024-04-30 PROCEDURE — 99214 OFFICE O/P EST MOD 30 MIN: CPT | Performed by: INTERNAL MEDICINE

## 2024-04-30 PROCEDURE — 93000 ELECTROCARDIOGRAM COMPLETE: CPT | Performed by: INTERNAL MEDICINE

## 2024-04-30 RX ORDER — SUMATRIPTAN 100 MG/1
50 TABLET, FILM COATED ORAL
COMMUNITY
Start: 2024-03-05

## 2024-04-30 ASSESSMENT — PATIENT HEALTH QUESTIONNAIRE - PHQ9
SUM OF ALL RESPONSES TO PHQ QUESTIONS 1-9: 0
1. LITTLE INTEREST OR PLEASURE IN DOING THINGS: NOT AT ALL
SUM OF ALL RESPONSES TO PHQ QUESTIONS 1-9: 0
2. FEELING DOWN, DEPRESSED OR HOPELESS: NOT AT ALL
SUM OF ALL RESPONSES TO PHQ9 QUESTIONS 1 & 2: 0
SUM OF ALL RESPONSES TO PHQ QUESTIONS 1-9: 0
SUM OF ALL RESPONSES TO PHQ QUESTIONS 1-9: 0

## 2024-04-30 NOTE — PROGRESS NOTES
[address] [place] [address] [phone]    [address], [place] [address]     Subjective:        Alex Jose is a 44 y.o. male is here for routine f/u.  Last seen by us in April 2023.  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  The patient denies chest pain/ shortness of breath, orthopnea, PND, LE edema, syncope, presyncope or fatigue.    Patient Active Problem List    Diagnosis Date Noted    Current smoker 02/06/2023      Unknown, Provider, APRN - NP  History reviewed. No pertinent past medical history.   Past Surgical History:   Procedure Laterality Date    APPENDECTOMY       Allergies   Allergen Reactions    Poison Ivy Extract Other (See Comments)     \"Pt denies allergy\"      History reviewed. No pertinent family history.   Social History     Socioeconomic History    Marital status:      Spouse name: Not on file    Number of children: Not on file    Years of education: Not on file    Highest education level: Not on file   Occupational History    Not on file   Tobacco Use    Smoking status: Former     Current packs/day: 1.00     Types: Cigarettes     Passive exposure: Past    Smokeless tobacco: Not on file   Substance and Sexual Activity    Alcohol use: No    Drug use: Yes     Types: Marijuana (Weed)     Comment: everyday a lot    Sexual activity: Not on file   Other Topics Concern    Not on file   Social History Narrative    Not on file     Social Determinants of Health     Financial Resource Strain: Not on file   Food Insecurity: Not on file   Transportation Needs: Not on file   Physical Activity: Not on file   Stress: Not on file   Social Connections: Not on file   Intimate Partner Violence: Not on file   Housing Stability: Not on file      Current Outpatient Medications   Medication Sig    SUMAtriptan (IMITREX) 100 MG tablet Take 0.5

## 2024-04-30 NOTE — PATIENT INSTRUCTIONS
Your physician has ordered a 2 weeks Heart Monitor, please follow instructions inside box. At the end of the 2 weeks, return monitor to address listed on documents inside box.   Continue eliquis.

## 2024-04-30 NOTE — TELEPHONE ENCOUNTER
Enrolled with [employer] - Ordered and being shipped to patient's home address on file.  ETA within 5-7 business days.        Message  Received: Today  Dagmar Adair, [address] has ordered a 2 weeks Event for palp and Afib for above patient.  Please order.  thanks  Dagmar

## 2024-05-28 RX ORDER — APIXABAN 5 MG/1
5 TABLET, FILM COATED ORAL 2 TIMES DAILY
Qty: 60 TABLET | Refills: 3 | Status: SHIPPED | OUTPATIENT
Start: 2024-05-28

## 2024-05-28 NOTE — TELEPHONE ENCOUNTER
Refill Request Received for the Following Medication     Requested Prescriptions     Pending Prescriptions Disp Refills    ELIQUIS 5 MG TABS tablet [Pharmacy Med Name: ELIQUIS 5 MG TABLET] 60 tablet 0     Sig: TAKE 1 TABLET BY MOUTH TWICE A DAY       Last Prescribed:04-    Last Appointment With Me:  4/30/2024     Future Appointments:  Future Appointments   Date Time Provider Department Center   5/5/2025  9:40 AM Luis Alberto Herrera MD [facility]

## 2024-06-03 ENCOUNTER — TELEPHONE (OUTPATIENT)
Age: 44
End: 2024-06-03

## 2024-06-03 NOTE — TELEPHONE ENCOUNTER
----- Message from Luis Alberto Herrera MD sent at 5/31/2024 10:38 PM EDT -----  Please advise good news monitor shows no evidence of atrial fibrillation, just 2% early heartbeats from the bottom chambers of the heart which are not dangerous, and no specific treatment needed for that.  Can discontinue Eliquis at this time, remove from his list, and started on 81 mg of aspirin enteric-coated daily.  Let us know if concerns for recurrent symptoms of atrial fibrillation like rapid irregular heartbeat at rest.  Follow-up as planned.    Future Appointments  5/5/2025   9:40 AM    Luis Alberto Herrera MD [facility]   This nurse attempted to contact patient.  Mobile number's mail voice full.

## 2024-06-13 RX ORDER — ASPIRIN 81 MG/1
81 TABLET ORAL DAILY
Qty: 90 TABLET | Refills: 4 | Status: SHIPPED | OUTPATIENT
Start: 2024-06-13

## 2024-06-13 NOTE — TELEPHONE ENCOUNTER
Left msge on voice mail to call office back at [phone]. Mail box full, unable to leave any message.   Letter with results mailed to address on file.     Emeka DIAZ per 's order. ASA order placed.

## 2024-07-29 ENCOUNTER — TELEPHONE (OUTPATIENT)
Age: 44
End: 2024-07-29

## 2024-07-29 NOTE — TELEPHONE ENCOUNTER
Patient states he was on eliquis a while back and was put on baby aspirin. He says that the baby aspirin does not seem to be working and would like to get back on eliquis if he could.      Phone [phone]  [facility] [phone]

## 2024-07-30 NOTE — TELEPHONE ENCOUNTER
Attempted to contact patient unsuccessfully.  Noted that rowan was d/c per dr Herrera on 04-30-24 since only 2% of Afib found in monitor.   Any rapid heart beats felt? Still consuming large amount of caffeine?    Message sent to MD.

## 2024-07-30 NOTE — TELEPHONE ENCOUNTER
Please clarify to the patient that the Eliquis is a blood thinner for A-fib.  His last monitor showed no atrial fibrillation.  He had 2% PVCs or premature ventricular contractions which are early heartbeats from the bottom chambers of the heart.  These early heartbeats are a nuisance but they are not dangerous and in reality no treatment is needed.  Eliquis does nothing at all to suppress these PVCs.  Since he did not have atrial fibrillation he only needs aspirin at this time.  The medicine that is attempting to suppress the early heartbeats was metoprolol.  It looks like he is no longer taking that.  If he wants a medicine to try to suppress PVCs because they are very bothersome, he can try metoprolol tartrate 25 mg twice a day.

## 2024-11-28 ENCOUNTER — APPOINTMENT (OUTPATIENT)
Facility: HOSPITAL | Age: 44
End: 2024-11-28

## 2024-11-28 ENCOUNTER — HOSPITAL ENCOUNTER (EMERGENCY)
Facility: HOSPITAL | Age: 44
Discharge: HOME | End: 2024-11-28
Attending: STUDENT IN AN ORGANIZED HEALTH CARE EDUCATION/TRAINING PROGRAM

## 2024-11-28 VITALS
DIASTOLIC BLOOD PRESSURE: 83 MMHG | HEIGHT: 77 IN | RESPIRATION RATE: 15 BRPM | OXYGEN SATURATION: 92 % | BODY MASS INDEX: 23.32 KG/M2 | TEMPERATURE: 98.1 F | WEIGHT: 197.53 LBS | SYSTOLIC BLOOD PRESSURE: 105 MMHG | HEART RATE: 69 BPM

## 2024-11-28 DIAGNOSIS — I49.3 PVC (PREMATURE VENTRICULAR CONTRACTION): ICD-10-CM

## 2024-11-28 DIAGNOSIS — R00.2 PALPITATIONS: Primary | ICD-10-CM

## 2024-11-28 DIAGNOSIS — Z86.79 HISTORY OF ATRIAL FIBRILLATION: ICD-10-CM

## 2024-11-28 LAB
ALBUMIN SERPL-MCNC: 4.4 G/DL (ref 3.5–5)
ALBUMIN/GLOB SERPL: 1.3 (ref 1.1–2.2)
ALP SERPL-CCNC: 45 U/L (ref 45–117)
ALT SERPL-CCNC: 32 U/L (ref 12–78)
ANION GAP SERPL CALC-SCNC: 7 MMOL/L (ref 2–12)
AST SERPL-CCNC: 26 U/L (ref 15–37)
BASOPHILS # BLD: 0.1 K/UL (ref 0–0.1)
BASOPHILS NFR BLD: 1 % (ref 0–1)
BILIRUB SERPL-MCNC: 0.6 MG/DL (ref 0.2–1)
BUN SERPL-MCNC: 19 MG/DL (ref 6–20)
BUN/CREAT SERPL: 15 (ref 12–20)
CALCIUM SERPL-MCNC: 9.6 MG/DL (ref 8.5–10.1)
CHLORIDE SERPL-SCNC: 103 MMOL/L (ref 97–108)
CO2 SERPL-SCNC: 29 MMOL/L (ref 21–32)
COMMENT:: NORMAL
CREAT SERPL-MCNC: 1.25 MG/DL (ref 0.7–1.3)
D DIMER PPP FEU-MCNC: <0.19 MG/L FEU (ref 0–0.65)
DIFFERENTIAL METHOD BLD: NORMAL
EKG ATRIAL RATE: 100 BPM
EKG DIAGNOSIS: NORMAL
EKG P AXIS: 62 DEGREES
EKG P-R INTERVAL: 176 MS
EKG Q-T INTERVAL: 340 MS
EKG QRS DURATION: 94 MS
EKG QTC CALCULATION (BAZETT): 438 MS
EKG R AXIS: 92 DEGREES
EKG T AXIS: 60 DEGREES
EKG VENTRICULAR RATE: 100 BPM
EOSINOPHIL # BLD: 0.2 K/UL (ref 0–0.4)
EOSINOPHIL NFR BLD: 2 % (ref 0–7)
ERYTHROCYTE [DISTWIDTH] IN BLOOD BY AUTOMATED COUNT: 12.4 % (ref 11.5–14.5)
GLOBULIN SER CALC-MCNC: 3.3 G/DL (ref 2–4)
GLUCOSE SERPL-MCNC: 159 MG/DL (ref 65–100)
HCT VFR BLD AUTO: 45.2 % (ref 36.6–50.3)
HGB BLD-MCNC: 15 G/DL (ref 12.1–17)
IMM GRANULOCYTES # BLD AUTO: 0 K/UL (ref 0–0.04)
IMM GRANULOCYTES NFR BLD AUTO: 0 % (ref 0–0.5)
LYMPHOCYTES # BLD: 2.9 K/UL (ref 0.8–3.5)
LYMPHOCYTES NFR BLD: 32 % (ref 12–49)
MCH RBC QN AUTO: 31.6 PG (ref 26–34)
MCHC RBC AUTO-ENTMCNC: 33.2 G/DL (ref 30–36.5)
MCV RBC AUTO: 95.2 FL (ref 80–99)
MONOCYTES # BLD: 0.7 K/UL (ref 0–1)
MONOCYTES NFR BLD: 8 % (ref 5–13)
NEUTS SEG # BLD: 5.1 K/UL (ref 1.8–8)
NEUTS SEG NFR BLD: 57 % (ref 32–75)
NRBC # BLD: 0 K/UL (ref 0–0.01)
NRBC BLD-RTO: 0 PER 100 WBC
PLATELET # BLD AUTO: 215 K/UL (ref 150–400)
PMV BLD AUTO: 11.4 FL (ref 8.9–12.9)
POTASSIUM SERPL-SCNC: 3.1 MMOL/L (ref 3.5–5.1)
PROT SERPL-MCNC: 7.7 G/DL (ref 6.4–8.2)
RBC # BLD AUTO: 4.75 M/UL (ref 4.1–5.7)
SODIUM SERPL-SCNC: 139 MMOL/L (ref 136–145)
SPECIMEN HOLD: NORMAL
TROPONIN I SERPL HS-MCNC: 25 NG/L (ref 0–76)
TROPONIN I SERPL HS-MCNC: 32 NG/L (ref 0–76)
WBC # BLD AUTO: 9.1 K/UL (ref 4.1–11.1)

## 2024-11-28 PROCEDURE — 85025 COMPLETE CBC W/AUTO DIFF WBC: CPT

## 2024-11-28 PROCEDURE — 6360000002 HC RX W HCPCS

## 2024-11-28 PROCEDURE — 84484 ASSAY OF TROPONIN QUANT: CPT

## 2024-11-28 PROCEDURE — 36415 COLL VENOUS BLD VENIPUNCTURE: CPT

## 2024-11-28 PROCEDURE — 85379 FIBRIN DEGRADATION QUANT: CPT

## 2024-11-28 PROCEDURE — 71046 X-RAY EXAM CHEST 2 VIEWS: CPT

## 2024-11-28 PROCEDURE — 93005 ELECTROCARDIOGRAM TRACING: CPT

## 2024-11-28 PROCEDURE — 96374 THER/PROPH/DIAG INJ IV PUSH: CPT

## 2024-11-28 PROCEDURE — 99285 EMERGENCY DEPT VISIT HI MDM: CPT

## 2024-11-28 PROCEDURE — 93010 ELECTROCARDIOGRAM REPORT: CPT | Performed by: SPECIALIST

## 2024-11-28 PROCEDURE — 6370000000 HC RX 637 (ALT 250 FOR IP): Performed by: STUDENT IN AN ORGANIZED HEALTH CARE EDUCATION/TRAINING PROGRAM

## 2024-11-28 PROCEDURE — 80053 COMPREHEN METABOLIC PANEL: CPT

## 2024-11-28 RX ORDER — POTASSIUM CHLORIDE 750 MG/1
40 TABLET, EXTENDED RELEASE ORAL ONCE
Status: COMPLETED | OUTPATIENT
Start: 2024-11-28 | End: 2024-11-28

## 2024-11-28 RX ORDER — KETOROLAC TROMETHAMINE 30 MG/ML
30 INJECTION, SOLUTION INTRAMUSCULAR; INTRAVENOUS
Status: COMPLETED | OUTPATIENT
Start: 2024-11-28 | End: 2024-11-28

## 2024-11-28 RX ADMIN — KETOROLAC TROMETHAMINE 30 MG: 30 INJECTION, SOLUTION INTRAMUSCULAR at 01:57

## 2024-11-28 RX ADMIN — POTASSIUM CHLORIDE 40 MEQ: 750 TABLET, FILM COATED, EXTENDED RELEASE ORAL at 04:42

## 2024-11-28 ASSESSMENT — PAIN - FUNCTIONAL ASSESSMENT
PAIN_FUNCTIONAL_ASSESSMENT: NONE - DENIES PAIN
PAIN_FUNCTIONAL_ASSESSMENT: PREVENTS OR INTERFERES SOME ACTIVE ACTIVITIES AND ADLS

## 2024-11-28 ASSESSMENT — PAIN SCALES - GENERAL: PAINLEVEL_OUTOF10: 3

## 2024-11-28 ASSESSMENT — PAIN DESCRIPTION - LOCATION: LOCATION: CHEST

## 2024-11-28 ASSESSMENT — HEART SCORE: ECG: NORMAL

## 2024-11-28 ASSESSMENT — PAIN DESCRIPTION - ORIENTATION: ORIENTATION: MID

## 2024-11-28 ASSESSMENT — PAIN DESCRIPTION - DESCRIPTORS: DESCRIPTORS: TIGHTNESS

## 2024-11-28 NOTE — ED TRIAGE NOTES
Patient arrived POV to ED cc of shortness of breath, palpitations and chest pain since about 1930 tonight. Patient has a hx of afib, is currently taking 81mg aspirin daily.     Patient denies dizziness, N/V. Pain radiates to left shoulder.

## 2024-11-28 NOTE — ED PROVIDER NOTES
[facility] EMERGENCY DEP  EMERGENCY DEPARTMENT ENCOUNTER      Pt Name: Alex Jose  MRN: [id number]  Birthdate 1980  Date of evaluation: 11/28/2024  Provider: Jaden Masterson PA-C    CHIEF COMPLAINT       Chief Complaint   Patient presents with    Palpitations    Chest Pain         HISTORY OF PRESENT ILLNESS    Patient is an 44 y.o. male with history of appendectomy and paroxysmal atrial fibrillation who presents to the ER with reports of shortness of breath, palpitations, and chest pain that started around 7:30PM. Patient reports pain radiates to left shoulder. Patient reports he has a history of afib and not currently on any blood thinners, but does take 81mg ASA. Patient was previously to get off of Eliquis per his cardiologist.  Patient reports that the chest pain is very minimal and more concerned about the palpitations. Patient denies abdominal pain, urinary symptoms, nausea or vomiting, diarrhea or constipation, headache, dizziness, lightheadedness, fever or chills.  Patient denies alcohol use, former cigarette smoking, and admits to marijuana use.          Nursing Notes were reviewed.    REVIEW OF SYSTEMS       Review of Systems      PAST MEDICAL HISTORY   No past medical history on file.      SURGICAL HISTORY       Past Surgical History:   Procedure Laterality Date    APPENDECTOMY           CURRENT MEDICATIONS       Previous Medications    ASPIRIN 81 MG EC TABLET    Take 1 tablet by mouth daily    METOPROLOL SUCCINATE (TOPROL XL) 25 MG EXTENDED RELEASE TABLET    TAKE 1/2 TABLET BY MOUTH EVERY DAY    METOPROLOL TARTRATE (LOPRESSOR) 25 MG TABLET    Take 25 mg by mouth 2 times daily    SILDENAFIL (REVATIO) 20 MG TABLET    Take 1 tablet by mouth 3 times daily headaches    SUMATRIPTAN (IMITREX) 100 MG TABLET    Take 0.5 tablets by mouth once as needed       ALLERGIES     Poison ivy extract    FAMILY HISTORY     No family history on file.       SOCIAL HISTORY       Social History     Socioeconomic History    Marital

## 2024-12-03 ENCOUNTER — TELEPHONE (OUTPATIENT)
Age: 44
End: 2024-12-03

## 2024-12-03 NOTE — TELEPHONE ENCOUNTER
Patient is calling because he had chest pain over the weekend.Patient went to [facility] ER and they told him to follow up with the cardiologist.    Patient is trying to get an appointment with the doctor.I have nothing available to offer.      [phone]

## 2024-12-10 ENCOUNTER — OFFICE VISIT (OUTPATIENT)
Age: 44
End: 2024-12-10

## 2024-12-10 VITALS
HEIGHT: 77 IN | SYSTOLIC BLOOD PRESSURE: 100 MMHG | WEIGHT: 194 LBS | HEART RATE: 54 BPM | OXYGEN SATURATION: 98 % | DIASTOLIC BLOOD PRESSURE: 60 MMHG | BODY MASS INDEX: 22.91 KG/M2 | RESPIRATION RATE: 16 BRPM

## 2024-12-10 DIAGNOSIS — R06.02 SHORTNESS OF BREATH: ICD-10-CM

## 2024-12-10 DIAGNOSIS — I48.0 PAROXYSMAL ATRIAL FIBRILLATION (HCC): ICD-10-CM

## 2024-12-10 DIAGNOSIS — R00.2 PALPITATIONS: Primary | ICD-10-CM

## 2024-12-10 DIAGNOSIS — F41.9 ANXIETY: ICD-10-CM

## 2024-12-10 PROCEDURE — 99214 OFFICE O/P EST MOD 30 MIN: CPT | Performed by: INTERNAL MEDICINE

## 2024-12-10 PROCEDURE — 93005 ELECTROCARDIOGRAM TRACING: CPT | Performed by: INTERNAL MEDICINE

## 2024-12-10 ASSESSMENT — PATIENT HEALTH QUESTIONNAIRE - PHQ9
2. FEELING DOWN, DEPRESSED OR HOPELESS: NOT AT ALL
SUM OF ALL RESPONSES TO PHQ QUESTIONS 1-9: 0
SUM OF ALL RESPONSES TO PHQ QUESTIONS 1-9: 0
1. LITTLE INTEREST OR PLEASURE IN DOING THINGS: NOT AT ALL
SUM OF ALL RESPONSES TO PHQ9 QUESTIONS 1 & 2: 0
SUM OF ALL RESPONSES TO PHQ QUESTIONS 1-9: 0
SUM OF ALL RESPONSES TO PHQ QUESTIONS 1-9: 0

## 2024-12-10 NOTE — PROGRESS NOTES
stated in the HPI    Physical ExamPhysical Exam:    Vitals:    12/10/24 1411   BP: 100/60   Pulse: 54   Resp: 16   SpO2: 98%   Weight: 88 kg (194 lb)   Height: 1.956 m (6' 5\")     Body mass index is 23.01 kg/m².    General PE   General:  Well developed, in no acute distress, cooperative and alert  HEENT: No carotid bruits, no JVD, trach is midline. Neck Supple, PEERL, EOM intact.  Heart:  reg rate and rhythm; normal S1/S2; no murmurs, gallops or rubs.   Respiratory: Clear bilaterally x 4, no wheezing or rales  Abdomen:   Soft, non-tender, no distention, no masses. + BS.   Extremities:  Normal cap refill, no cyanosis, atraumatic.  No edema.  Neuro: A&Ox3, speech clear, gait stable.   Skin: Skin color is normal. No rashes or lesions. Non diaphoretic  Vascular: 2+ pulses symmetric in all extremities    Labs:   CBC   Lab Results   Component Value Date    WBC 9.1 11/28/2024    HGB 15.0 11/28/2024    HCT 45.2 11/28/2024    MCV 95.2 11/28/2024     11/28/2024       CMP  Lab Results   Component Value Date     11/28/2024    K 3.1 (L) 11/28/2024     11/28/2024    CO2 29 11/28/2024    BUN 19 11/28/2024    CREATININE 1.25 11/28/2024    GLUCOSE 159 (H) 11/28/2024    CALCIUM 9.6 11/28/2024    BILITOT 0.6 11/28/2024    ALKPHOS 45 11/28/2024    AST 26 11/28/2024    ALT 32 11/28/2024    LABGLOM 73 11/28/2024    AGRATIO 1.4 02/04/2023    GLOB 3.3 11/28/2024       INR  No results found for: \"INR\", \"PROTIME\"     LIPIDS  No results found for: \"LIPIDPAN\"      Lipids No results found for: \"CHOL\", \"TRIG\", \"HDL\", \"CHOLHDLRATIO\"      ECHO:  03/14/23    TRANSTHORACIC ECHOCARDIOGRAM (TTE) COMPLETE (CONTRAST/BUBBLE/3D PRN) 03/14/2023  8:01 PM (Final)    Narrative  This is a summary report. The complete report is available in the patient's medical record. If you cannot access the medical record, please contact the sending organization for a detailed fax or copy.      Left Ventricle: Normal left ventricular systolic function

## 2025-09-05 ENCOUNTER — HOSPITAL ENCOUNTER (EMERGENCY)
Facility: HOSPITAL | Age: 45
Discharge: HOME | End: 2025-09-05
Attending: STUDENT IN AN ORGANIZED HEALTH CARE EDUCATION/TRAINING PROGRAM

## 2025-09-05 ENCOUNTER — APPOINTMENT (OUTPATIENT)
Facility: HOSPITAL | Age: 45
End: 2025-09-05

## 2025-09-05 VITALS
WEIGHT: 202.82 LBS | SYSTOLIC BLOOD PRESSURE: 132 MMHG | TEMPERATURE: 97.1 F | OXYGEN SATURATION: 97 % | DIASTOLIC BLOOD PRESSURE: 86 MMHG | BODY MASS INDEX: 23.95 KG/M2 | HEART RATE: 86 BPM | RESPIRATION RATE: 18 BRPM | HEIGHT: 77 IN

## 2025-09-05 DIAGNOSIS — I48.0 PAROXYSMAL ATRIAL FIBRILLATION (HCC): Primary | ICD-10-CM

## 2025-09-05 DIAGNOSIS — F41.1 ANXIETY STATE: ICD-10-CM

## 2025-09-05 PROBLEM — F41.9 ANXIETY: Status: ACTIVE | Noted: 2025-09-05

## 2025-09-05 LAB
ALBUMIN SERPL-MCNC: 4.5 G/DL (ref 3.5–5.2)
ALBUMIN/GLOB SERPL: 1.5 (ref 1.1–2.2)
ALP SERPL-CCNC: 50 U/L (ref 40–129)
ALT SERPL-CCNC: 12 U/L (ref 10–50)
AMPHET UR QL SCN: NEGATIVE
ANION GAP SERPL CALC-SCNC: 12 MMOL/L (ref 2–14)
APAP SERPL-MCNC: 7 UG/ML (ref 10–30)
AST SERPL-CCNC: 18 U/L (ref 10–50)
BARBITURATES UR QL SCN: NEGATIVE
BASOPHILS # BLD: 0.04 K/UL (ref 0–0.1)
BASOPHILS NFR BLD: 0.5 % (ref 0–1)
BENZODIAZ UR QL: NEGATIVE
BILIRUB SERPL-MCNC: 0.7 MG/DL (ref 0–1.2)
BUN SERPL-MCNC: 15 MG/DL (ref 6–20)
BUN/CREAT SERPL: 12 (ref 12–20)
CALCIUM SERPL-MCNC: 9.9 MG/DL (ref 8.6–10)
CANNABINOIDS UR QL SCN: NEGATIVE
CHLORIDE SERPL-SCNC: 101 MMOL/L (ref 98–107)
CO2 SERPL-SCNC: 26 MMOL/L (ref 20–29)
COCAINE UR QL SCN: NEGATIVE
COMMENT:: NORMAL
CREAT SERPL-MCNC: 1.23 MG/DL (ref 0.7–1.2)
DIFFERENTIAL METHOD BLD: NORMAL
EOSINOPHIL # BLD: 0.12 K/UL (ref 0–0.4)
EOSINOPHIL NFR BLD: 1.6 % (ref 0–7)
ERYTHROCYTE [DISTWIDTH] IN BLOOD BY AUTOMATED COUNT: 12.2 % (ref 11.5–14.5)
ETHANOL SERPL-MCNC: <11 MG/DL (ref 0–0.08)
FENTANYL: NEGATIVE
GLOBULIN SER CALC-MCNC: 2.9 G/DL (ref 2–4)
GLUCOSE SERPL-MCNC: 125 MG/DL (ref 65–100)
HCT VFR BLD AUTO: 48.1 % (ref 36.6–50.3)
HGB BLD-MCNC: 16.3 G/DL (ref 12.1–17)
IMM GRANULOCYTES # BLD AUTO: 0.02 K/UL (ref 0–0.04)
IMM GRANULOCYTES NFR BLD AUTO: 0.3 % (ref 0–0.5)
LYMPHOCYTES # BLD: 1.65 K/UL (ref 0.8–3.5)
LYMPHOCYTES NFR BLD: 21.7 % (ref 12–49)
Lab: NORMAL
MAGNESIUM SERPL-MCNC: 2.1 MG/DL (ref 1.6–2.6)
MCH RBC QN AUTO: 32 PG (ref 26–34)
MCHC RBC AUTO-ENTMCNC: 33.9 G/DL (ref 30–36.5)
MCV RBC AUTO: 94.5 FL (ref 80–99)
METHADONE UR QL: NEGATIVE
MONOCYTES # BLD: 0.47 K/UL (ref 0–1)
MONOCYTES NFR BLD: 6.2 % (ref 5–13)
NEUTS SEG # BLD: 5.29 K/UL (ref 1.8–8)
NEUTS SEG NFR BLD: 69.7 % (ref 32–75)
NRBC # BLD: 0 K/UL (ref 0–0.01)
NRBC BLD-RTO: 0 PER 100 WBC
OPIATES UR QL: NEGATIVE
PCP UR QL: NEGATIVE
PLATELET # BLD AUTO: 207 K/UL (ref 150–400)
PMV BLD AUTO: 11.8 FL (ref 8.9–12.9)
POTASSIUM SERPL-SCNC: 4.1 MMOL/L (ref 3.5–5.1)
PROT SERPL-MCNC: 7.4 G/DL (ref 6.4–8.3)
RBC # BLD AUTO: 5.09 M/UL (ref 4.1–5.7)
SALICYLATES SERPL-MCNC: <0.5 MG/DL (ref 3–10)
SODIUM SERPL-SCNC: 138 MMOL/L (ref 136–145)
SPECIMEN HOLD: NORMAL
TROPONIN T SERPL HS-MCNC: 6.7 NG/L (ref 0–22)
TSH, 3RD GENERATION: 2.12 UIU/ML (ref 0.27–4.2)
WBC # BLD AUTO: 7.6 K/UL (ref 4.1–11.1)

## 2025-09-05 PROCEDURE — 80053 COMPREHEN METABOLIC PANEL: CPT

## 2025-09-05 PROCEDURE — 6360000002 HC RX W HCPCS: Performed by: PHYSICIAN ASSISTANT

## 2025-09-05 PROCEDURE — 2580000003 HC RX 258: Performed by: STUDENT IN AN ORGANIZED HEALTH CARE EDUCATION/TRAINING PROGRAM

## 2025-09-05 PROCEDURE — 83735 ASSAY OF MAGNESIUM: CPT

## 2025-09-05 PROCEDURE — 82077 ASSAY SPEC XCP UR&BREATH IA: CPT

## 2025-09-05 PROCEDURE — 80143 DRUG ASSAY ACETAMINOPHEN: CPT

## 2025-09-05 PROCEDURE — 80179 DRUG ASSAY SALICYLATE: CPT

## 2025-09-05 PROCEDURE — 6370000000 HC RX 637 (ALT 250 FOR IP): Performed by: PHYSICIAN ASSISTANT

## 2025-09-05 PROCEDURE — 84443 ASSAY THYROID STIM HORMONE: CPT

## 2025-09-05 PROCEDURE — 85025 COMPLETE CBC W/AUTO DIFF WBC: CPT

## 2025-09-05 PROCEDURE — 71046 X-RAY EXAM CHEST 2 VIEWS: CPT

## 2025-09-05 PROCEDURE — 84484 ASSAY OF TROPONIN QUANT: CPT

## 2025-09-05 PROCEDURE — 70450 CT HEAD/BRAIN W/O DYE: CPT

## 2025-09-05 RX ORDER — ACETAMINOPHEN 325 MG/1
975 TABLET ORAL
Status: DISCONTINUED | OUTPATIENT
Start: 2025-09-05 | End: 2025-09-05

## 2025-09-05 RX ORDER — DILTIAZEM HYDROCHLORIDE 120 MG/1
120 CAPSULE, COATED, EXTENDED RELEASE ORAL DAILY
Qty: 30 CAPSULE | Refills: 0 | Status: SHIPPED | OUTPATIENT
Start: 2025-09-05

## 2025-09-05 RX ORDER — KETOROLAC TROMETHAMINE 30 MG/ML
15 INJECTION, SOLUTION INTRAMUSCULAR; INTRAVENOUS
Status: COMPLETED | OUTPATIENT
Start: 2025-09-05 | End: 2025-09-05

## 2025-09-05 RX ORDER — ACETAMINOPHEN 325 MG/1
975 TABLET ORAL
Status: COMPLETED | OUTPATIENT
Start: 2025-09-05 | End: 2025-09-05

## 2025-09-05 RX ORDER — 0.9 % SODIUM CHLORIDE 0.9 %
1000 INTRAVENOUS SOLUTION INTRAVENOUS ONCE
Status: COMPLETED | OUTPATIENT
Start: 2025-09-05 | End: 2025-09-05

## 2025-09-05 RX ORDER — HYDROXYZINE HYDROCHLORIDE 25 MG/1
25-50 TABLET, FILM COATED ORAL EVERY 6 HOURS PRN
Qty: 60 TABLET | Refills: 0 | Status: SHIPPED | OUTPATIENT
Start: 2025-09-05 | End: 2025-10-05

## 2025-09-05 RX ORDER — METOPROLOL TARTRATE 1 MG/ML
5 INJECTION, SOLUTION INTRAVENOUS EVERY 5 MIN PRN
Status: DISCONTINUED | OUTPATIENT
Start: 2025-09-05 | End: 2025-09-05 | Stop reason: HOSPADM

## 2025-09-05 RX ORDER — METOPROLOL TARTRATE 25 MG/1
25 TABLET, FILM COATED ORAL
Status: COMPLETED | OUTPATIENT
Start: 2025-09-05 | End: 2025-09-05

## 2025-09-05 RX ORDER — HYDROXYZINE HYDROCHLORIDE 25 MG/1
25 TABLET, FILM COATED ORAL
Status: COMPLETED | OUTPATIENT
Start: 2025-09-05 | End: 2025-09-05

## 2025-09-05 RX ADMIN — KETOROLAC TROMETHAMINE 15 MG: 30 INJECTION, SOLUTION INTRAMUSCULAR at 14:44

## 2025-09-05 RX ADMIN — METOPROLOL TARTRATE 25 MG: 25 TABLET, FILM COATED ORAL at 14:43

## 2025-09-05 RX ADMIN — SODIUM CHLORIDE 1000 ML: 0.9 INJECTION, SOLUTION INTRAVENOUS at 14:46

## 2025-09-05 RX ADMIN — ACETAMINOPHEN 975 MG: 325 TABLET ORAL at 16:58

## 2025-09-05 RX ADMIN — APIXABAN 5 MG: 5 TABLET, FILM COATED ORAL at 14:43

## 2025-09-05 RX ADMIN — HYDROXYZINE HYDROCHLORIDE 25 MG: 25 TABLET ORAL at 16:58

## 2025-09-05 ASSESSMENT — ENCOUNTER SYMPTOMS
VOMITING: 1
BACK PAIN: 1
NAUSEA: 1
SHORTNESS OF BREATH: 0

## 2025-09-05 ASSESSMENT — PAIN - FUNCTIONAL ASSESSMENT
PAIN_FUNCTIONAL_ASSESSMENT: 0-10
PAIN_FUNCTIONAL_ASSESSMENT: 0-10

## 2025-09-05 ASSESSMENT — PAIN SCALES - GENERAL: PAINLEVEL_OUTOF10: 7

## 2025-09-07 LAB
EKG DIAGNOSIS: NORMAL
EKG Q-T INTERVAL: 286 MS
EKG QRS DURATION: 100 MS
EKG QTC CALCULATION (BAZETT): 406 MS
EKG R AXIS: 120 DEGREES
EKG T AXIS: 26 DEGREES
EKG VENTRICULAR RATE: 121 BPM